# Patient Record
Sex: FEMALE | Race: WHITE | HISPANIC OR LATINO | Employment: FULL TIME | URBAN - METROPOLITAN AREA
[De-identification: names, ages, dates, MRNs, and addresses within clinical notes are randomized per-mention and may not be internally consistent; named-entity substitution may affect disease eponyms.]

---

## 2017-02-03 ENCOUNTER — HISTORICAL (OUTPATIENT)
Dept: ADMINISTRATIVE | Facility: HOSPITAL | Age: 47
End: 2017-02-03

## 2017-06-13 DIAGNOSIS — M25.552 LEFT HIP PAIN: Primary | ICD-10-CM

## 2017-06-14 ENCOUNTER — OFFICE VISIT (OUTPATIENT)
Dept: ORTHOPEDICS | Facility: CLINIC | Age: 47
End: 2017-06-14
Payer: COMMERCIAL

## 2017-06-14 ENCOUNTER — HOSPITAL ENCOUNTER (OUTPATIENT)
Dept: RADIOLOGY | Facility: HOSPITAL | Age: 47
Discharge: HOME | End: 2017-06-14
Attending: ORTHOPAEDIC SURGERY
Payer: COMMERCIAL

## 2017-06-14 VITALS
BODY MASS INDEX: 25.76 KG/M2 | DIASTOLIC BLOOD PRESSURE: 73 MMHG | WEIGHT: 140 LBS | HEIGHT: 62 IN | HEART RATE: 81 BPM | SYSTOLIC BLOOD PRESSURE: 108 MMHG

## 2017-06-14 DIAGNOSIS — M25.552 LEFT HIP PAIN: ICD-10-CM

## 2017-06-14 DIAGNOSIS — M25.552 LEFT HIP PAIN: Primary | ICD-10-CM

## 2017-06-14 DIAGNOSIS — M70.62 TROCHANTERIC BURSITIS, LEFT HIP: Primary | ICD-10-CM

## 2017-06-14 PROCEDURE — 99203 OFFICE O/P NEW LOW 30 MIN: CPT | Mod: S$GLB,,, | Performed by: ORTHOPAEDIC SURGERY

## 2017-06-14 PROCEDURE — 99999 PR PBB SHADOW E&M-EST. PATIENT-LVL III: CPT | Mod: PBBFAC,,, | Performed by: ORTHOPAEDIC SURGERY

## 2017-06-14 PROCEDURE — 73502 X-RAY EXAM HIP UNI 2-3 VIEWS: CPT | Mod: TC,PO,LT

## 2017-06-14 PROCEDURE — 73502 X-RAY EXAM HIP UNI 2-3 VIEWS: CPT | Mod: 26,LT,, | Performed by: RADIOLOGY

## 2017-06-14 NOTE — PROGRESS NOTES
History reviewed. No pertinent past medical history.    History reviewed. No pertinent surgical history.    No current outpatient prescriptions on file.     No current facility-administered medications for this visit.        Review of patient's allergies indicates:   Allergen Reactions    Benzodiazepines        History reviewed. No pertinent family history.    Social History     Social History    Marital status: Single     Spouse name: N/A    Number of children: N/A    Years of education: N/A     Occupational History    Not on file.     Social History Main Topics    Smoking status: Never Smoker    Smokeless tobacco: Never Used    Alcohol use No    Drug use: No    Sexual activity: Not on file     Other Topics Concern    Not on file     Social History Narrative    No narrative on file       Chief Complaint:   Chief Complaint   Patient presents with    Left Hip - Pain       History of present illness: Is a 46-year-old active female seen for about 1 year of left hip pain.  Patient denies an injury or trauma.  Patient has been seeing Dr. Ventura.  Patient has pain when laying down, sitting, standing or prolonged walking.  Pain is located laterally and in the groin at times.  She had a cortisone injection back in May didn't help.  She rates her pain as a 6 out of 10.  She had an MRI done which shows partial-thickness left gluteus minimus tendon tear.  She has never had physical therapy.  The next treatment option per Dr. Ventura was PRP.      Review of Systems:    Constitution: Negative for chills, fever, and sweats.  Negative for unexplained weight loss.    HENT:  Negative for headaches and blurry vision.    Cardiovascular:Negative for chest pain or irregular heart beat. Negative for hypertension.    Respiratory:  Negative for cough and shortness of breath.    Gastrointestinal: Negative for abdominal pain, heartburn, melena, nausea, and vomitting.    Genitourinary:  Negative bladder incontinence and  dysuria.    Musculoskeletal:  See HPI    Neurological: Negative for numbness.    Psychiatric/Behavioral: Negative for depression.  The patient is not nervous/anxious.      Endocrine: Negative for polyuria    Hematologic/Lymphatic: Negative for bleeding problem.  Does not bruise/bleed easily.    Skin: Negative for poor would healing and rash      Physical Examination:    Vital Signs:    Vitals:    06/14/17 0848   BP: 108/73   Pulse: 81       Body mass index is 25.61 kg/m².    This a well-developed, well nourished patient in no acute distress.  They are alert and oriented and cooperative to examination.  Pt. walks without an antalgic gait.      Examination of the patient's left hip shows full range of motion with flexion to 160°, extension to 0, external rotation to 50°, internal rotation of 15°, abduction of 50°, adduction of 15°. Skin has no rashes or bruising. Patient has negative Stinchfield exam. Patient has negative straight leg raise.Negative internal impingement test. Negative DAVID test. Negative Louie's test. Patient has no pain with hip range of motion.  Minimally tender to palpation over the greater trochanteric bursa. Patient is 5 out of 5 motor strength, palpable distal pulses, and intact light touch sensation.     Examination of the patient's right hip shows full range of motion with flexion to 160°, extension to 0, external rotation to 50°, internal rotation of 15°, abduction of 50°, adduction of 15°. Skin has no rashes or bruising. Patient has negative Stinchfield exam. Patient has negative straight leg raise.Negative internal impingement test. Negative DAVID test. Negative Louie's test. Patient has no pain with hip range of motion. Nontender to palpation over the greater trochanteric bursa. Patient is 5 out of 5 motor strength, palpable distal pulses, and intact light touch sensation.       X-rays: X-rays left hip are ordered and reviewed which show well-maintained joint space in both hips.  Does have  some arthritic changes of the lumbar spine    MRI of the left hip from [facility] dated February 3, 2017: Mild edema around the insertions of the left gluteus minimus and gluteus medius tendons.  Low-grade partial-thickness tear of the left gluteus minimus.  Mild left hamstring origin tendinosis.  L4-5 and L5-S1 degenerative disc disease.     Assessment:: Left gluteus minimus partial-thickness tearing    Plan:  I reviewed the x-rays and the MRI findings with her today.  I recommended formal physical therapy with focus treatment on the abductor tendons and trochanteric bursa.  Hopefully being get some healing of the tendons at that point.  We talked about activity.  I would let her exercises tolerated.  Follow-up in 6 weeks to see a physical therapy is helping.    This note was created using Dragon voice recognition software that occasionally misinterpreted phrases or words.    Consult note is delivered via Epic messaging service.

## 2017-06-15 ENCOUNTER — TELEPHONE (OUTPATIENT)
Dept: ORTHOPEDICS | Facility: CLINIC | Age: 47
End: 2017-06-15

## 2017-06-28 ENCOUNTER — CLINICAL SUPPORT (OUTPATIENT)
Dept: REHABILITATION | Facility: HOSPITAL | Age: 47
End: 2017-06-28
Attending: ORTHOPAEDIC SURGERY
Payer: COMMERCIAL

## 2017-06-28 DIAGNOSIS — M25.552 LEFT HIP PAIN: ICD-10-CM

## 2017-06-28 PROCEDURE — 97161 PT EVAL LOW COMPLEX 20 MIN: CPT | Mod: PN

## 2017-06-28 PROCEDURE — 97110 THERAPEUTIC EXERCISES: CPT | Mod: PN

## 2017-06-28 NOTE — PROGRESS NOTES
Physical Therapy Evaluation    Name: Fouzia Ovallerego  Clinic Number: [id number]      Medical Diagnosis:   Encounter Diagnosis   Name Primary?    Left hip pain      PT Diagnosis: L hip pain, LE weakness  Physician: Layton Sprague,*  Treatment Orders: PT Eval and Treat    No past medical history on file.  No current outpatient prescriptions on file.     No current facility-administered medications for this visit.      Review of patient's allergies indicates:   Allergen Reactions    Benzodiazepines        Precautions: standard    Evaluation Date: 6/28/17      Subjective     Onset Date: pain to L hip for at least a year.  She ran in marathons and would get pain at mile 6, when she started training for another marathon she started having pain at 1.5 miles.  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 female that presents to [facility] secondary to lateral L hip.     Imaging: X-ray or MRI taken and revealed 1. Mild edema about insertions of left gluteus minimus and gluteus medius  tendons, with moderately attenuated insertion of left gluteus minimus tendon  suggesting low-grade partial-thickness tear of the left gluteus minimus tendon.  Edema suggesting peritendinitis inflammation/enthesopathy which could be  related to low-grade strain if history of recent trauma or chronic repetitive  stress. Additional associated mild tendinosis of left gluteus minimus tendon is  evident    2. Mild left hamstrings origin tendinosis.    3. L4-L5 and L5-S1 degenerative disc disease, partially  visualized..    Pain: current 9/10, worst 0/10, best 0/10  Aggravating factors: sit to stand, lying in bed at night, ice  Easing factors: pain meds, ibuprofen    Pts goals: decreased L hip pain, return to running      No cultural,  or spiritual barriers identified to treatment or learning.  Environmental barriers: 3 flights of stairs at apt (pt able to perform, but pain after 1st flight)  Objective     Observation: pt is pleasant and cooperative    Posture: R shoulder elevated         Hip Range of Motion:   Left active Right active    Flexion       Abduction       Extension       Ext. Rotation 33 35   Int. Rotation 35 pain 40             Lower Extremity Strength  Right LE  Left LE    Knee extension: 5/5 Knee extension: 5/5   Knee flexion: 4+/5 Knee flexion: 5/5   Hip flexion: 4+/5 Hip flexion: 4/5 pain   Hip Internal Rotation:  4+/5    Hip Internal Rotation: 4+/5      Hip External Rotation: 5/5    Hip External Rotation: 4+/5      Hip extension:  4/5 Hip extension: 4/5   Hip abduction: 4/5 Hip abduction: 4+/5   Hip adduction: 4/5 Hip adduction 4+/5   Ankle dorsiflexion: 5/5 Ankle dorsiflexion: 5/5   Ankle plantarflexion: 5/5 Ankle plantarflexion: 5/5       Flexibility:    Ely's test: R = - degrees ; L = - degrees   Popliteal Angle: R = -0 degrees ; L = -0 degrees   Louie's test: R = + ; L = +     Palpation: TTP to L greater trochanter    Sensation: grossly intact to light touch    Edema: none    Functional Limitations Reports - G Codes  Category: mobility  CMS Impairment/Limitation/Restriction for FOTO Hip Survey  Status Limitation G-Code CMS Severity Modifier  Intake 52% 48% Current Status CK - At least 40 percent but less than 60 percent  Predicted 68% 32% Goal Status+ CJ - At least 20 percent but less than 40 percent    PT Evaluation Completed? Yes  Discussed Plan of Care with patient: Yes    TREATMENT:  Fouzia received therapeutic exercises to develop strength and endurance, flexibility for 25 minutes  including:  L piriformis release in supine over racket ball x2 min  L piriformis stretch (fig 4) supine 3x20 sec  L TFL stretch in supine with strap 3x20 sec  SLR x10  SL hip add x10  SL hip abd/ext x10  SL clams with TA set 10x3 sec  Bridges with hip abd iso RTB 10x5 sec    Pt educated on importance of performing ex and daily activities in pain free range to avoid exacerbating inflammation and pain.  Pt gave verbal understanding.    Fouzia  received the following manual therapy techniques x 5 min. To include pin and stretch to L piriformis in prone (hip ER)     Pt. Received cold pack x 10 min. To L hip/buttock. Following treatment.    HEP Provided: pt given verbal and written instruction for all ex listed above  Instructed pt. Regarding: Proper technique with all exercises. Pt demo good understanding of the education provided. Fouzia demonstrated good return demonstration of activities.  Pt/family was provided educational information, including: role of PT, goals for PT, scheduling - pt verbalized understanding. Discussed insurance limitations with pt.     Pt has no cultural, educational or language barriers to learning provided.    Assessment     This is a 46 y.o. female referred to outpatient physical therapy and presents with a medical diagnosis of L hip pain and demonstrates limitations as described in the problem list. Pt will benefit from physcial therapy services in order to maximize pain free and/or functional use of left LE. The following goals were discussed with the patient and patient is in agreement with them as to be addressed in the treatment plan.     History  Co-morbidities and personal factors that may impact the plan of care Examination  Body Structures and Functions, activity limitations and participation restrictions that may impact the plan of care Clinical Presentation   Decision Making/ Complexity Score   Co-morbidities:         none        Personal Factors:    Body Regions: L LE    Body  Systems:     Decreased L hip ROM  Decreased LE strength  Decreased IT band and piriformis flexibilty      Activity limitations:   Stairs, prolonged walking, running    Participation Restrictions:     Marathons, stairs at apt, work duties     stable low         Medical necessity is demonstrated by the following IMPAIRMENTS/PROBLEM LIST:   1)Increase in L hip pain level limiting function   2)decreased LE strength   3)decreased IT band flexibilty   4)decreased L hip ROM   5)Lack of HEP      Pt's spiritual, cultural and educational needs considered and pt agreeable to plan of care and goals as stated below:     GOALS: Short Term Goals:  4 weeks  1.Report decreased    L hip    pain  <   / =  3  /10  to increase tolerance for stairs  2. Increased L hip IR by 3 deg for increased functional mobility  3. Increased L LE strength by 1/3 muscle grade in all deficient planes to increase tolerance for ADL and work activities.  4. Pt will present without L piriformis tightness for improved posture and tolerance for stairs  5. Pt to tolerate HEP to improve ROM and independence with ADL's    Long Term Goals: 8-12 weeks  1.Report decreased    L hip    pain  <   / =  1  /10  to increase tolerance for stairs  2. Increased L hip IR by 5 deg for increased functional mobility  3. Increased L LE strength by 1 muscle grade in all deficient planes to increase tolerance for ADL and work activities.  4. Pt to tolerate HEP to improve ROM and independence with ADL's  5. Negative Louie's test for improved TFL and IT band flexibility for improved form walking  6. Pt will report running 3-5 miles without pain.    Plan     Pt will be treated by physical therapy 1-3 times a week for 8-12 weeks for Pt Education, HEP, therapeutic exercises, neuromuscular re-education, joint mobilizations, modalities prn to achieve established goals. Fouzia may at times be seen by a PTA as part of the Rehab Team.     Cont PT for  8-12 weeks.     I certify the need for  these services furnished under this plan of treatment and while under my care.______________________________ Physician/Referring Practitioner  Date of Signature

## 2017-07-05 ENCOUNTER — CLINICAL SUPPORT (OUTPATIENT)
Dept: REHABILITATION | Facility: HOSPITAL | Age: 47
End: 2017-07-05
Attending: ORTHOPAEDIC SURGERY
Payer: COMMERCIAL

## 2017-07-05 DIAGNOSIS — M25.552 LEFT HIP PAIN: ICD-10-CM

## 2017-07-05 PROCEDURE — 97140 MANUAL THERAPY 1/> REGIONS: CPT | Mod: PN

## 2017-07-05 PROCEDURE — 97110 THERAPEUTIC EXERCISES: CPT | Mod: PN

## 2017-07-05 NOTE — PROGRESS NOTES
[id number]  Date of Treatment: 07/05/2017   Diagnosis:   Encounter Diagnosis   Name Primary?    Left hip pain        Visit number: 2   Start date: 06/28/17  POC End date: 09/20/17 (12 weeks)    Time in: 5:02  Time Out: 5:53  Total Treatment Time: 50  1:1 Time: 30 min    Precautions: standard    Subjective:    Fouzia reports improvement of symptoms to the L hip. States most of her pain is when transferring from sitting to standing, which causes pain to be 7/10. Patient reports their current pain to be 0/10 on a 0-10 scale with 0 being no pain and 10 being the worst pain imaginable. 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    Objective  L hip anteriorly rotated upon arrival. Pt received MET to correct rotation prior to tx.    Fouzia received therapeutic exercises to develop strength and endurance, flexibility for 40 minutes (20 min 1:1) including:  L piriformis release in supine over racket ball x2 min  L piriformis stretch (fig 4) supine 3x20 sec  L TFL stretch in supine with strap 3x20 sec  SLR 2x10  SL hip add 2x10  SL hip abd/ext 2x10  SL clams with TA set 20x3 sec  Bridges with hip abd iso RTB 2x10x5 sec     Pt educated on importance of performing ex and daily activities in pain free range to avoid exacerbating inflammation and pain.  Pt gave verbal understanding.     Fouzia  received the following manual therapy techniques x 10 min to include STM and myofascial release of anterior aspect of ITB on L thigh     Pt. Received cold pack x 10 min. To L hip/buttock. Following treatment. (pt deferred)     HEP Provided: pt given verbal and written instruction for all ex listed above.Educated to attempt at least 1 flight of stairs to apartment, either first or last, to assess and slowly build tolerance to activity.  Instructed pt. Regarding: Proper technique with all exercises.  Pt demo good understanding of the education provided. Fouzia demonstrated good return demonstration of activities.    Assessment:     Pt tolerated treatment well this date without increased soreness or pain. L hip pain remains 0/10 post tx. TTP around lateral aspect of L hip joint as well as anterior border of L ITB, (distal > proximal).  Pt will continue to benefit from skilled PT intervention. Medical Necessity is demonstrated by:  Pain limits function of effected part for some activities, Unable to participate fully in daily activities and Weakness.    Patient is making good progress towards established goals.        Plan:  Continue with established Plan of Care towards PT goals.

## 2017-07-06 NOTE — PROGRESS NOTES
Name: Fouzia Gaviria  [facility] Number: [id number]  Date of Treatment: 07/06/2017   Diagnosis:   Encounter Diagnosis   Name Primary?    Left hip pain        Visit number: 3  Start date: 06/28/17  POC End date: 09/20/17 (12 weeks)    Time in: 5:05  Time Out: 6:00  Total Treatment Time: 50  1:1 time: 40    Precautions: standard    Subjective:    Fouzia reports improvement of symptoms to the L hip. States most of her pain is when transferring from sitting to standing, which causes pain to be 7/10. Patient reports their current pain to be 0/10 on a 0-10 scale with 0 being no pain and 10 being the worst pain imaginable. 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    Objective  L hip anteriorly rotated upon arrival. Pt received MET to correct rotation prior to tx.  Fouzia  received the following manual therapy techniques x 10 min to include: MET to correct L anterior innominate, jt mob to correct L anterior innominate in R SL, STM and myofascial release of anterior aspect of ITB on L thigh    Fouzia received therapeutic exercises to develop strength and endurance, flexibility for 40 minutes (20 min 1:1) including:  L piriformis release in supine over racket ball x2 min  L piriformis stretch (fig 4) supine 3x20 sec  L TFL stretch in supine with strap 3x20 sec  SLR 2x10  SL hip add 2x10  SL hip abd/ext 2x10  SL clams with TA set RTB 20x3 sec  Bridges with hip abd iso RTB 2x10x5 sec  SL sidelying shuttle 0.5# 2x10 ea    Add hip abd walk and mini squats next visit     Pt instructed in and perform stick massage to L IT band x2 min    Pt educated on importance of performing ex and daily activities in pain free range to avoid exacerbating inflammation and pain.  Pt gave verbal understanding.     Pt. Received cold pack x 10 min. To L hip/buttock.      HEP Provided: pt given RTB for clams.    Instructed pt. Regarding: Proper  technique with all exercises. Pt demo good understanding of the education provided. Fouzia demonstrated good return demonstration of activities.    Assessment:     Pt tolerated treatment well reporting 0/10 pain post tx.  She was able to tolerate addition of SL shuttle and increased resistance for clams without exacerbation of symptoms.  Continues to present with L anterior innominate and tight L IT band which were both addressed with manual therapy.  Pt will continue to benefit from skilled PT intervention. Medical Necessity is demonstrated by:  Pain limits function of effected part for some activities, Unable to participate fully in daily activities and Weakness.    Patient is making good progress towards established goals.        Plan:  Continue with established Plan of Care towards PT goals. Continue to progress glute strengthening and LE flexibility as tolerated.

## 2017-07-10 ENCOUNTER — CLINICAL SUPPORT (OUTPATIENT)
Dept: REHABILITATION | Facility: HOSPITAL | Age: 47
End: 2017-07-10
Attending: ORTHOPAEDIC SURGERY
Payer: COMMERCIAL

## 2017-07-10 DIAGNOSIS — M25.552 LEFT HIP PAIN: ICD-10-CM

## 2017-07-10 PROCEDURE — 97110 THERAPEUTIC EXERCISES: CPT | Mod: PN

## 2017-07-10 PROCEDURE — 97140 MANUAL THERAPY 1/> REGIONS: CPT | Mod: PN

## 2017-07-13 ENCOUNTER — CLINICAL SUPPORT (OUTPATIENT)
Dept: REHABILITATION | Facility: HOSPITAL | Age: 47
End: 2017-07-13
Attending: ORTHOPAEDIC SURGERY
Payer: COMMERCIAL

## 2017-07-13 DIAGNOSIS — M25.552 LEFT HIP PAIN: ICD-10-CM

## 2017-07-13 PROCEDURE — 97110 THERAPEUTIC EXERCISES: CPT | Mod: PN

## 2017-07-13 PROCEDURE — 97140 MANUAL THERAPY 1/> REGIONS: CPT | Mod: PN

## 2017-07-13 NOTE — PROGRESS NOTES
Name: Fouzia Gaviria  [facility] Number: [id number]  Date of Treatment: 07/13/2017   Diagnosis:   Encounter Diagnosis   Name Primary?    Left hip pain        Visit number: 4  Start date: 06/28/17  POC End date: 09/20/17 (12 weeks)    Time in: 2:03  Time Out: 3:10  Total Treatment Time: 55  1:1 time: 30    Precautions: standard    Subjective:    Fouzia reports improvement of symptoms to the L hip. Denies any pain in the L hip currently. Patient reports their current pain to be 0/10 on a 0-10 scale with 0 being no pain and 10 being the worst pain imaginable. 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    Objective  L hip anteriorly rotated upon arrival. Pt received MET to correct rotation prior to tx.  Fouzia  received the following manual therapy techniques x 10 min to include: MET to correct L anterior innominate, jt mob to correct L anterior innominate in R SL, STM and myofascial release of anterior aspect of ITB on L thigh    Fouzia received therapeutic exercises to develop strength and endurance, flexibility for 45 minutes (20 min 1:1) including:  L piriformis release in supine over racket ball x2 min  L piriformis stretch (fig 4) supine 3x20 sec  L TFL stretch in supine with strap 3x20 sec  SLR 2x10  SL hip add 2x10  SL hip abd/ext 2x10  SL clams with TA set RTB 20x3 sec  Bridges with hip abd iso RTB 2x10x5 sec  SL sidelying shuttle 0.5# 2x10 ea  ADDED:  hip abd walk red sport loop x 3 laps in // bars  mini squats 2x10     Pt instructed in and perform stick massage to L IT band x2 min    Pt educated on importance of performing ex and daily activities in pain free range to avoid exacerbating inflammation and pain.  Pt gave verbal understanding.     Pt. Received cold pack x 10 min. To L hip/buttock.      HEP Provided: pt educated to continue with current written HEP.     Instructed pt. Regarding: Proper technique  with all exercises. Pt demo good understanding of the education provided. Fouzia demonstrated good return demonstration of activities.    Assessment:     Pt demonstrated overall good tolerance to treatment session without increased soreness or pain. Post tx she rates her L lateral hip pain to be 0/10. Able to tolerate sidesteps with RTB and mini squats without exacerbation of symptoms. Continues to present with mild L anterior innominate and tight L IT band which were both addressed with manual therapy and relief reported post tx. Mild tenderness at mid ITB and superior to greater trochanter with STM.   Pt will continue to benefit from skilled PT intervention. Medical Necessity is demonstrated by:  Pain limits function of effected part for some activities, Unable to participate fully in daily activities and Weakness.    Patient is making good progress towards established goals.        Plan:  Continue with established Plan of Care towards PT goals. Continue to progress glute strengthening and LE flexibility as tolerated.

## 2017-07-17 ENCOUNTER — CLINICAL SUPPORT (OUTPATIENT)
Dept: REHABILITATION | Facility: HOSPITAL | Age: 47
End: 2017-07-17
Attending: ORTHOPAEDIC SURGERY
Payer: COMMERCIAL

## 2017-07-17 DIAGNOSIS — M25.552 LEFT HIP PAIN: ICD-10-CM

## 2017-07-17 PROCEDURE — 97110 THERAPEUTIC EXERCISES: CPT | Mod: PN

## 2017-07-17 PROCEDURE — 97140 MANUAL THERAPY 1/> REGIONS: CPT | Mod: PN

## 2017-07-17 NOTE — PROGRESS NOTES
"Name: Fouzia Gaviria  [facility] Number: [id number]  Date of Treatment: 07/17/2017   Diagnosis:   Encounter Diagnosis   Name Primary?    Left hip pain        Visit number: 5  Start date: 06/28/17  POC End date: 09/20/17 (12 weeks)    Time in: 5:15  Time Out: 6:00  Total Treatment Time: 40    Precautions: standard    Subjective:    Patient reports their current pain to be 0/10 at rest and 5/10 with walking to anterior L hip on a 0-10 scale with 0 being no pain and 10 being the worst pain imaginable. She states her L hip pain increased after prolonged standing/walking and "overdoing it" at a birthday party on Saturday.    Objective  L anterior innominate.   TTP to L grt trochanter    Fouzia  received the following manual therapy techniques x 10 min to include: MET to correct L anterior innominate, L psoas release  Fouzia received therapeutic exercises to develop strength and endurance, flexibility for 30 minutes including:  L piriformis release in supine over racket ball x2 min  L piriformis stretch (fig 4) supine 3x20 sec  L TFL stretch in supine with strap 3x20 sec  SLR 2x10 NP  SL hip add 2x10 NP  SL hip abd/ext 2x10  SL clams with TA set blue sport loop 20x3 sec  Bridges with hip abd iso blue sport loop 2x10x5 sec  SL sidelying shuttle 0.5# 2x10 ea  hip abd walk red sport loop x 3 laps in // bars NP  mini squats x10  FMS squats     Pt instructed in and perform stick massage to L IT band x2 min NP    Pt educated on importance of performing ex and daily activities in pain free range to avoid exacerbating inflammation and pain.  Pt gave verbal understanding.     Pt. Received cold pack x 10 min. To L hip/buttock.      HEP Provided: pt educated to continue with current written HEP.   Pt instructed to avoid activities which increase pain and apply ice to L hip daily for decreased pain and inflammation.  Pt gave verbal understanding.  Instructed pt. Regarding: Proper technique with all exercises. Pt demo good " understanding of the education provided. Fouzia demonstrated good return demonstration of activities.    Assessment:     Pt tolerated tx session fairly well reporting 0/10 pain post tx.  She presented with L anterior innominate and tight L hip flexor which corrected with manual therapy.  Pt with increased pain/inflammation as noted by pain to L hip with L sidelying today.  Pt will continue to benefit from skilled PT intervention. Medical Necessity is demonstrated by:  Pain limits function of effected part for some activities, Unable to participate fully in daily activities and Weakness.    Patient is making good progress towards established goals.        Plan:  Continue with established Plan of Care towards PT goals. Continue to progress glute strengthening and LE flexibility as tolerated.

## 2017-07-19 ENCOUNTER — CLINICAL SUPPORT (OUTPATIENT)
Dept: REHABILITATION | Facility: HOSPITAL | Age: 47
End: 2017-07-19
Attending: ORTHOPAEDIC SURGERY
Payer: COMMERCIAL

## 2017-07-19 DIAGNOSIS — M25.552 LEFT HIP PAIN: ICD-10-CM

## 2017-07-19 PROCEDURE — 97110 THERAPEUTIC EXERCISES: CPT | Mod: PN

## 2017-07-19 NOTE — PROGRESS NOTES
Name: Fouzia Gaviria  Clinic Number: [id number]  Date of Treatment: 07/19/2017   Diagnosis:   Encounter Diagnosis   Name Primary?    Left hip pain        Visit number: 6  Start date: 06/28/17  POC End date: 09/20/17 (12 weeks)    Time in: 5:03  Time Out: 6:07  Total Treatment Time: 50    Precautions: standard    Subjective:    Patient reports their current pain to be 0/10 at rest to anterior L hip on a 0-10 scale with 0 being no pain and 10 being the worst pain imaginable. She states her L quad feels tight/sore.     Objective  Slight L anterior innominate pre-treatment  TTP to L grt trochanter    Fouzia  received the following manual therapy techniques x 2 min to include: MET to correct L anterior innominate    Fouzia received therapeutic exercises to develop strength and endurance, flexibility for 50 minutes including:  L piriformis release in supine over racket ball x2 min  L piriformis stretch (fig 4) supine 3x20 sec  L TFL stretch in supine with strap 3x20 sec  Prone quad stretch with strap and 1/2 roll under distal thigh 3x20 sec - relief noted  SLR 2x10  SL hip add 2x10  SL hip abd/ext 2x10  SL clams with TA set blue sport loop 20x3 sec  Bridges with hip abd iso blue sport loop 2x10x5 sec  SL (bilateral) sidelying shuttle 0.5# 2x10 ea  hip abd walk red sport loop x 3 laps in // bars   mini squats x10  FMS squats with 1# dowel x 10     Pt instructed in and performed stick massage to L IT band x2 min    Pt educated on importance of performing ex and daily activities in pain free range to avoid exacerbating inflammation and pain.  Pt gave verbal understanding.     Pt. Received cold pack x 10 min. To L hip/buttock.      HEP Provided: pt educated to continue with current written HEP.   Pt instructed to avoid activities which increase pain and apply ice to L hip daily for decreased pain and inflammation.  Pt gave verbal understanding.  Instructed pt. Regarding: Proper technique with all exercises. Pt theron  good understanding of the education provided. Fouzia demonstrated good return demonstration of activities.    Assessment:     Fouzia tolerated treatment well this date without onset of soreness or pain in the L hip. She continues with mild tenderness and tightness in the mid L ITB. Minimal L anterior innominate upon arrival which improved with MET. Pt able to tolerate all exercises this date, mild verbal and tactile cues for proper positioning for SL CLAMS on L hip. Strength slowly improving in core and L hip. She will benefit from continued treatment.   Pt will continue to benefit from skilled PT intervention. Medical Necessity is demonstrated by:  Pain limits function of effected part for some activities, Unable to participate fully in daily activities and Weakness.    Patient is making good progress towards established goals.      Plan:  Continue with established Plan of Care towards PT goals. Continue to progress glute strengthening and LE flexibility as tolerated.

## 2017-07-24 ENCOUNTER — CLINICAL SUPPORT (OUTPATIENT)
Dept: REHABILITATION | Facility: HOSPITAL | Age: 47
End: 2017-07-24
Attending: ORTHOPAEDIC SURGERY
Payer: COMMERCIAL

## 2017-07-24 DIAGNOSIS — M25.552 LEFT HIP PAIN: ICD-10-CM

## 2017-07-24 PROCEDURE — 97110 THERAPEUTIC EXERCISES: CPT | Mod: PN

## 2017-07-24 NOTE — PROGRESS NOTES
Name: Fouzia Gaviria  Clinic Number: [id number]  Date of Treatment: 07/24/2017   Diagnosis:   Encounter Diagnosis   Name Primary?    Left hip pain        Visit number: 7  Start date: 06/28/17  POC End date: 09/20/17 (12 weeks)    Time in: 3:04  Time Out: 3:59  Total Treatment Time: 55    Precautions: standard    Subjective:    Patient reports their current pain to be 0/10 at rest to anterior and posterior L hip on a 0-10 scale with 0 being no pain and 10 being the worst pain imaginable. States she had mild tightness     Objective  B pelvis aligned pre-treatment    Fouzia  received the following manual therapy techniques x 2 min to include: MET to correct L anterior innominate (not performed)    Fouzia received therapeutic exercises to develop strength and endurance, flexibility for 55 minutes including:  L piriformis release in supine over racket ball x2 min (NP)  L piriformis stretch (fig 4) supine 3x20 sec  L TFL stretch in supine with strap 3x20 sec  Prone quad stretch with strap and 1/2 roll under distal thigh 2 x 60 sec - relief noted  SLR 3x10  SL hip add 2x10  SL hip abd/ext 2x10  SL clams with TA set blue sport loop 20x3 sec  Bridges with hip abd iso blue sport loop 2x10x5 sec  SL (bilateral) sidelying shuttle 1# 2x10 ea  hip abd walk red sport loop x 4 laps in // bars   mini squats x10 (NP)  FMS squats with 1# dowel 2 x 10     Pt instructed in and performed stick massage to L IT band x2 min    Pt educated on importance of performing ex and daily activities in pain free range to avoid exacerbating inflammation and pain.  Pt gave verbal understanding.     Pt. Received cold pack x 10 min. To L hip/buttock. (pt deferred)     HEP Provided: pt educated to continue with current written HEP.   Pt instructed to avoid activities which increase pain and apply ice to L hip daily for decreased pain and inflammation.  Pt gave verbal understanding.  Instructed pt. Regarding: Proper technique with all exercises. Pt  theron good understanding of the education provided. Fouzia demonstrated good return demonstration of activities.    Assessment:     Pt demonstrated good tolerance to treatment this date without reported soreness or pain in the L hip. Rates 0/10 pain post treatment. Decreased tightness of L ITB and is compliant with using roller at home. Pelvis aligned post treatment. Core and B hip strength continues to improve.    Pt will continue to benefit from skilled PT intervention. Medical Necessity is demonstrated by:  Pain limits function of effected part for some activities, Unable to participate fully in daily activities and Weakness.    Patient is making good progress towards established goals.      Plan:  Continue with established Plan of Care towards PT goals. Continue to progress glute strengthening and LE flexibility as tolerated.

## 2017-07-26 ENCOUNTER — CLINICAL SUPPORT (OUTPATIENT)
Dept: REHABILITATION | Facility: HOSPITAL | Age: 47
End: 2017-07-26
Attending: ORTHOPAEDIC SURGERY
Payer: COMMERCIAL

## 2017-07-26 DIAGNOSIS — M25.552 LEFT HIP PAIN: ICD-10-CM

## 2017-07-26 PROCEDURE — 97110 THERAPEUTIC EXERCISES: CPT | Mod: PN

## 2017-07-26 NOTE — PROGRESS NOTES
Name: Fouzia Gaviria  Clinic Number: [id number]  Date of Treatment: 07/26/2017   Diagnosis:   Encounter Diagnosis   Name Primary?    Left hip pain        Visit number: 8  Start date: 06/28/17  POC End date: 09/20/17 (12 weeks)    Time in: 4:08  Time Out: 5:00  Total Treatment Time: 50    Precautions: standard    Subjective:    Patient reports their current pain in the anterior and posterior L hip to be 0/10 on a 0-10 scale with 0 being no pain and 10 being the worst pain imaginable. States she had mild tightness and discomfort around L greater trochanter.    Objective  B pelvis aligned pre-treatment    Fouzia  received the following manual therapy techniques x 2 min to include: inferior femoral glide with passive hip abduction.     Fouzia received therapeutic exercises to develop strength and endurance, flexibility for 50 minutes including:  L piriformis release in supine over racket ball x2 min (NP)  L piriformis stretch (fig 4) supine 3x20 sec  L TFL stretch in supine with strap 3x20 sec  Prone quad stretch with strap and 1/2 roll under distal thigh 2 x 60 sec - relief noted  SLR 3x10  SL hip add 2x10  SL hip abd/ext with blue sport loop 2x10  SL clams with TA set blue sport loop 20x3 sec  Bridges with hip abd iso blue sport loop 2x10x5 sec  Single leg bridge with TA set x10 each  SL (bilateral) sidelying shuttle 1 band 2x10 ea  hip abd walk red sport loop x 1 lap on orange/gray walkway   mini squats x10 (NP)  FMS squats with 1# dowel 2 x 10     Pt instructed in and performed stick massage to L IT band x2 min (NP)    Pt. Received cold pack x 10 min. To L hip/buttock. (pt deferred)     HEP Provided: pt educated to continue with current written HEP.   Pt instructed to avoid activities which increase pain and apply ice to L hip daily for decreased pain and inflammation.  Pt gave verbal understanding.  Instructed pt. Regarding: Proper technique with all exercises. Pt demo good understanding of the education  guicho Fragoso demonstrated good return demonstration of activities.    Assessment:     Pt tolerated treatment well this date overall without increased pain or soreness. She presents with mild tenderness superior to greater trochanter near throchanteric bursa, slight relief following inferior glide with passive hip abduction. Tenderness also near  She rates pain at 0/10 post treatment. Decreased tightness of L ITB with no tenderness reported. Pelvis aligned pre and post treatment. Core and B hip strength continues to improve, fatigue with resisted activity.    Pt will continue to benefit from skilled PT intervention. Medical Necessity is demonstrated by:  Pain limits function of effected part for some activities, Unable to participate fully in daily activities and Weakness.    Patient is making good progress towards established goals.      Plan:  Continue with established Plan of Care towards PT goals. Continue to progress glute strengthening and LE flexibility as tolerated.

## 2017-07-31 ENCOUNTER — CLINICAL SUPPORT (OUTPATIENT)
Dept: REHABILITATION | Facility: HOSPITAL | Age: 47
End: 2017-07-31
Attending: ORTHOPAEDIC SURGERY
Payer: COMMERCIAL

## 2017-07-31 DIAGNOSIS — M25.552 LEFT HIP PAIN: ICD-10-CM

## 2017-07-31 PROCEDURE — 97110 THERAPEUTIC EXERCISES: CPT | Mod: PN

## 2017-07-31 NOTE — PROGRESS NOTES
Name: Fouzia Gaviria  Clinic Number: [id number]  Date of Treatment: 07/31/2017   Diagnosis:   Encounter Diagnosis   Name Primary?    Left hip pain        Visit number: 8  Start date: 06/28/17  POC End date: 09/20/17 (12 weeks)    Time in: 3:50  Time Out: 5:00  Total Treatment Time: 55  1:1 time: 40    Precautions: standard    Subjective:    Patient reports their current pain in the anterior and posterior L hip to be 0/10 on a 0-10 scale with 0 being no pain and 10 being the worst pain imaginable. States she had mild tightness and discomfort around L greater trochanter.    Objective  Hip Range of Motion:   Left active Right active    Ext. Rotation 33 35   Int. Rotation 35  40       Lower Extremity Strength  Right LE  Left LE    Knee extension: 5/5 Knee extension: 5/5   Knee flexion: 5/5 Knee flexion: 5/5   Hip flexion: 4+/5 Hip flexion: 4+/5 pain   Hip Internal Rotation:  5/5    Hip Internal Rotation: 4+/5      Hip External Rotation: 5/5    Hip External Rotation: 5/5      Hip extension:  4/5 Hip extension: 4/5   Hip abduction: 4+/5 Hip abduction: 4+/5   Hip adduction: 4+/5 Hip adduction 4+/5   Ankle dorsiflexion: 5/5 Ankle dorsiflexion: 5/5   Ankle plantarflexion: 5/5 Ankle plantarflexion: 5/5       Flexibility:    Ely's test: R = - degrees ; L = - degrees   Popliteal Angle: R = -0 degrees ; L = -0 degrees   Louie's test: R = + ; L = -     B pelvis aligned pre-treatment    Fouzia  received the following manual therapy techniques x 2 min to include: long axis distraction of L hip     Fouzia  instructed in and performed therapeutic exercises to develop strength and endurance, flexibility for 50 minutes including:  L piriformis stretch (fig 4) supine 3x20 sec NP  L TFL stretch in supine with strap 3x20 sec  Prone quad stretch with strap and 1/2 roll under distal thigh 2 x 60 sec - relief noted  SLR 3x10 NP  SL hip add 2x10 NP  SL hip abd/ext with blue sport loop 2x10 on R and x15 on L 9  SL clams with TA set blue  sport loop 20x3 sec  Bridges with hip abd iso blue sport loop 2x10x5 sec  Single leg bridge with TA set x10 each  SL (bilateral) sidelying shuttle 1 band 2x10 ea  hip abd walk red sport loop x 1 lap on orange/gray walkway   mini squats x10 (NP)  FMS squats with 1# dowel 2 x 10 NP  Hip add in standing GTB 2x10 ea       Pt. Received cold pack x 10 min. To L hip/buttock     HEP Provided: pt educated to continue with current written HEP.   Pt instructed to avoid activities which increase pain and apply ice to L hip daily for decreased pain and inflammation.  Pt gave verbal understanding.  Instructed pt. Regarding: Proper technique with all exercises. Pt demo good understanding of the education provided. Fouzia demonstrated good return demonstration of activities.    Assessment:     Pt tolerated treatment well reporting 0/10 pain post tx.  She presented with slight L anterior innominate which corrected easily with MET.  Pt is improving with overall B LE strength and flexibility.  Pt will continue to benefit from skilled PT intervention. Medical Necessity is demonstrated by:  Pain limits function of effected part for some activities, Unable to participate fully in daily activities and Weakness.    Patient is making good progress towards established goals.  Short Term Goals:  4 weeks  1.Report decreased    L hip    pain  <   / =  3  /10  to increase tolerance for stairs (met)  2. Increased L hip IR by 3 deg for increased functional mobility (progressing)  3. Increased L LE strength by 1/3 muscle grade in all deficient planes to increase tolerance for ADL and work activities. (met)  4. Pt will present without L piriformis tightness for improved posture and tolerance for stairs (met)  5. Pt to tolerate HEP to improve ROM and independence with ADL's (met)    Long Term Goals: 8-12 weeks (progressing)  1.Report decreased    L hip    pain  <   / =  1  /10  to increase tolerance for stairs  2. Increased L hip IR by 5 deg for  increased functional mobility  3. Increased L LE strength by 1 muscle grade in all deficient planes to increase tolerance for ADL and work activities.  4. Pt to tolerate HEP to improve ROM and independence with ADL's  5. Negative Louie's test for improved TFL and IT band flexibility for improved form walking  6. Pt will report running 3-5 miles without pain.    Unmet goals continue to remain appropriate within 8 weeks.    Plan:  Continue with established Plan of Care towards PT goals. Continue to progress glute strengthening and LE flexibility as tolerated.    PT/PTA met face to face to discuss patient's treatment plan and progress towards established goals.  Treatment will be continued as described in initial report/eval and progress notes.  Patient will be seen by physical therapist every sixth visit and minimally once per month.

## 2017-08-02 ENCOUNTER — CLINICAL SUPPORT (OUTPATIENT)
Dept: REHABILITATION | Facility: HOSPITAL | Age: 47
End: 2017-08-02
Attending: ORTHOPAEDIC SURGERY
Payer: COMMERCIAL

## 2017-08-02 DIAGNOSIS — M25.552 LEFT HIP PAIN: ICD-10-CM

## 2017-08-02 PROCEDURE — 97110 THERAPEUTIC EXERCISES: CPT | Mod: PN

## 2017-08-02 NOTE — PROGRESS NOTES
Name: Fuozia Gaviria  Clinic Number: [id number]  Date of Treatment: 08/02/2017   Diagnosis:   Encounter Diagnosis   Name Primary?    Left hip pain        Visit number: 9  Start date: 06/28/17  POC End date: 09/20/17 (12 weeks)    Time in: 4:00 (patient arrived 1 hour early due to schedule mix-up)  Time Out: 4:52  Total Treatment Time: 50  1:1 time: 30    Precautions: standard    Subjective:    Patient reports their current pain in the anterior and posterior L hip to be 0/10 on a 0-10 scale with 0 being no pain and 10 being the worst pain imaginable. Has been icing more frequently which has helped reduce some pain and tightness. Mild pressure in the lateral L hip around L greater trochanter.    Objective     B pelvis aligned pre- and post treatment    Fouzia  received the following manual therapy techniques x 2 min to include: long axis distraction of L hip     Fouzia  instructed in and performed therapeutic exercises to develop strength and endurance, flexibility for 50 minutes including:  L piriformis stretch (fig 4) supine 3x20 sec NP  L TFL stretch in supine with strap 3x20 sec  Prone quad stretch with strap and 1/2 roll under distal thigh 2 x 60 sec - relief noted  SLR 3x10 NP  SL hip abd/ext with blue sport loop 2x10 each  SL clams with TA set blue sport loop 20x3 sec  Bridges with hip abd iso blue sport loop 2x10x5 sec  Single leg bridge with TA set x10 each   SL (bilateral) sidelying shuttle 1 band 2x15 ea  hip abd walk red sport loop x 1 lap on orange/gray walkway   FMS squats with 1# dowel 2 x 10 NP  Hip add in standing GTB 2x10 ea     Pt. Received cold pack x 10 min. To L hip/buttock     HEP Provided: pt educated to continue with current written HEP.   Pt instructed to avoid activities which increase pain and apply ice to L hip daily for decreased pain and inflammation.  Pt gave verbal understanding.  Instructed pt. Regarding: Proper technique with all exercises. Pt demo good understanding of the  education provided. Fouzia demonstrated good return demonstration of activities.    Assessment:     Pt demonstrated good tolerance to treatment this date without onset of soreness or pain. She continues with report of 0/10 pain post tx. Symmetric pelvic alignment this date pre and post treatment. Core and LE strength continues to improve. She remains compliant with HEP and motivated to improve.  Pt will continue to benefit from skilled PT intervention. Medical Necessity is demonstrated by:  Pain limits function of effected part for some activities, Unable to participate fully in daily activities and Weakness.    Patient is making good progress towards established goals.  Short Term Goals:  4 weeks  1.Report decreased    L hip    pain  <   / =  3  /10  to increase tolerance for stairs (met)  2. Increased L hip IR by 3 deg for increased functional mobility (progressing)  3. Increased L LE strength by 1/3 muscle grade in all deficient planes to increase tolerance for ADL and work activities. (met)  4. Pt will present without L piriformis tightness for improved posture and tolerance for stairs (met)  5. Pt to tolerate HEP to improve ROM and independence with ADL's (met)    Long Term Goals: 8-12 weeks (progressing)  1.Report decreased    L hip    pain  <   / =  1  /10  to increase tolerance for stairs  2. Increased L hip IR by 5 deg for increased functional mobility  3. Increased L LE strength by 1 muscle grade in all deficient planes to increase tolerance for ADL and work activities.  4. Pt to tolerate HEP to improve ROM and independence with ADL's  5. Negative Louie's test for improved TFL and IT band flexibility for improved form walking  6. Pt will report running 3-5 miles without pain.    Unmet goals continue to remain appropriate within 8 weeks.    Plan:  Continue with established Plan of Care towards PT goals. Continue to progress glute strengthening and LE flexibility as tolerated.

## 2017-08-07 ENCOUNTER — CLINICAL SUPPORT (OUTPATIENT)
Dept: REHABILITATION | Facility: HOSPITAL | Age: 47
End: 2017-08-07
Attending: ORTHOPAEDIC SURGERY
Payer: COMMERCIAL

## 2017-08-07 DIAGNOSIS — M25.552 LEFT HIP PAIN: ICD-10-CM

## 2017-08-07 NOTE — PROGRESS NOTES
Name: Fouzia Gaviria  Clinic Number: [id number]  Date of Treatment: 08/07/2017   Diagnosis:   Encounter Diagnosis   Name Primary?    Left hip pain        Visit number: 9  Start date: 06/28/17  POC End date: 09/20/17 (12 weeks)    Time in: 5:00 (patient arrived 1 hour early due to schedule mix-up)  Time Out: 6:10  Total Treatment Time: 55  1:1 time: 55    Precautions: standard    Subjective:    Patient reports her current pain in the anterior and posterior L hip to be 0/10 on a 0-10 scale with 0 being no pain and 10 being the worst pain imaginable. She states she has been compliant with ice and HEP.  She reported pressure to L anterior hip last night while trying to fall asleep.  Objective     B pelvis aligned pre- and post treatment    Fouzia  received the following manual therapy techniques x 2 min to include: long axis distraction of L hip NP     Fouzia  instructed in and performed therapeutic exercises to develop strength and endurance, flexibility for 55 minutes including:  L piriformis stretch (fig 4) supine 3x20 sec NP  L TFL stretch in supine with strap 3x20 sec  Prone quad stretch with strap and 1/2 roll under distal thigh 2 x 60 sec - relief noted  SLR 3x10 NP  SL hip abd/ext with blue sport loop 2x10 each  SL clams with TA set blue sport loop 20x3 sec  Bridges with hip abd iso blue sport loop 2x10x5 sec  Single leg bridge with TA set x10 each NP  SL (bilateral) sidelying shuttle 1 band 2x15 ea  hip abd walk red sport loop x 1 lap on orange/gray walkway   FMS squats with 1# dowel 2 x 10 NP  Hip add in standing GTB 2x10 ea NP  Elliptical x5 min, incline: 5, x5 min     Pt. Received cold pack x 10 min. To L hip/buttock     HEP Provided: pt educated to continue with current written HEP.   Pt instructed to avoid activities which increase pain and apply ice to L hip daily for decreased pain and inflammation.  Pt gave verbal understanding.  Instructed pt. Regarding: Proper technique with all exercises. Pt  theron good understanding of the education provided. Fouzia demonstrated good return demonstration of activities.    Assessment:     Pt demonstrated good tolerance to treatment reporting 0/10 pain to L hip post tx. Pt was able to tolerate elliptical today without pain, but overall fatigue noted.  Pt will continue to benefit from skilled PT intervention. Medical Necessity is demonstrated by:  Pain limits function of effected part for some activities, Unable to participate fully in daily activities and Weakness.    Patient is making good progress towards established goals.  Short Term Goals:  4 weeks  1.Report decreased    L hip    pain  <   / =  3  /10  to increase tolerance for stairs (met)  2. Increased L hip IR by 3 deg for increased functional mobility (progressing)  3. Increased L LE strength by 1/3 muscle grade in all deficient planes to increase tolerance for ADL and work activities. (met)  4. Pt will present without L piriformis tightness for improved posture and tolerance for stairs (met)  5. Pt to tolerate HEP to improve ROM and independence with ADL's (met)    Long Term Goals: 8-12 weeks (progressing)  1.Report decreased    L hip    pain  <   / =  1  /10  to increase tolerance for stairs  2. Increased L hip IR by 5 deg for increased functional mobility  3. Increased L LE strength by 1 muscle grade in all deficient planes to increase tolerance for ADL and work activities.  4. Pt to tolerate HEP to improve ROM and independence with ADL's  5. Negative Louie's test for improved TFL and IT band flexibility for improved form walking  6. Pt will report running 3-5 miles without pain.    Unmet goals continue to remain appropriate within 8 weeks.    Plan:  Continue with established Plan of Care towards PT goals. Continue to progress glute strengthening and LE flexibility as tolerated.

## 2017-08-09 ENCOUNTER — OFFICE VISIT (OUTPATIENT)
Dept: ORTHOPEDICS | Facility: CLINIC | Age: 47
End: 2017-08-09
Payer: COMMERCIAL

## 2017-08-09 ENCOUNTER — CLINICAL SUPPORT (OUTPATIENT)
Dept: REHABILITATION | Facility: HOSPITAL | Age: 47
End: 2017-08-09
Attending: ORTHOPAEDIC SURGERY
Payer: COMMERCIAL

## 2017-08-09 VITALS
SYSTOLIC BLOOD PRESSURE: 105 MMHG | DIASTOLIC BLOOD PRESSURE: 73 MMHG | HEIGHT: 62 IN | BODY MASS INDEX: 25.76 KG/M2 | HEART RATE: 75 BPM | WEIGHT: 140 LBS

## 2017-08-09 DIAGNOSIS — M70.62 TROCHANTERIC BURSITIS, LEFT HIP: Primary | ICD-10-CM

## 2017-08-09 DIAGNOSIS — M25.552 LEFT HIP PAIN: ICD-10-CM

## 2017-08-09 PROCEDURE — 3008F BODY MASS INDEX DOCD: CPT | Mod: S$GLB,,, | Performed by: ORTHOPAEDIC SURGERY

## 2017-08-09 PROCEDURE — 99999 PR PBB SHADOW E&M-EST. PATIENT-LVL III: CPT | Mod: PBBFAC,,, | Performed by: ORTHOPAEDIC SURGERY

## 2017-08-09 PROCEDURE — 99212 OFFICE O/P EST SF 10 MIN: CPT | Mod: S$GLB,,, | Performed by: ORTHOPAEDIC SURGERY

## 2017-08-09 PROCEDURE — 97110 THERAPEUTIC EXERCISES: CPT | Mod: PN

## 2017-08-09 NOTE — PROGRESS NOTES
History reviewed. No pertinent past medical history.    History reviewed. No pertinent surgical history.    No current outpatient prescriptions on file.     No current facility-administered medications for this visit.        Review of patient's allergies indicates:   Allergen Reactions    Benzodiazepines        History reviewed. No pertinent family history.    Social History     Social History    Marital status: Single     Spouse name: N/A    Number of children: N/A    Years of education: N/A     Occupational History    Not on file.     Social History Main Topics    Smoking status: Never Smoker    Smokeless tobacco: Never Used    Alcohol use No    Drug use: No    Sexual activity: Not on file     Other Topics Concern    Not on file     Social History Narrative    No narrative on file       Chief Complaint:   Chief Complaint   Patient presents with    Hip Pain     L hip 6wk f/u       Interval history: Is a 46-year-old active female seen for about 1 year of left hip pain.  Patient denies an injury or trauma.  Patient has been seeing Dr. Ventura.  Patient has pain when laying down, sitting, standing or prolonged walking.  Pain is located laterally and in the groin at times.  She had a cortisone injection back in May didn't help.  She rates her pain as a 6 out of 10.  She had an MRI done which shows partial-thickness left gluteus minimus tendon tear.  She has never had physical therapy.  The next treatment option per Dr. Ventura was PRP.    History of present illness: Physical therapy is definitely helping.  Pain is down to a 0 out of 10.  Just some mild irritation that she ices after therapy sessions.      Review of Systems:    Constitution: Negative for chills, fever, and sweats.  Negative for unexplained weight loss.    HENT:  Negative for headaches and blurry vision.    Cardiovascular:Negative for chest pain or irregular heart beat. Negative for hypertension.    Respiratory:  Negative for cough and  shortness of breath.    Gastrointestinal: Negative for abdominal pain, heartburn, melena, nausea, and vomitting.    Genitourinary:  Negative bladder incontinence and dysuria.    Musculoskeletal:  See HPI    Neurological: Negative for numbness.    Psychiatric/Behavioral: Negative for depression.  The patient is not nervous/anxious.      Endocrine: Negative for polyuria    Hematologic/Lymphatic: Negative for bleeding problem.  Does not bruise/bleed easily.    Skin: Negative for poor would healing and rash      Physical Examination:    Vital Signs:    Vitals:    08/09/17 1309   BP: 105/73   Pulse: 75       Body mass index is 25.61 kg/m².    This a well-developed, well nourished patient in no acute distress.  They are alert and oriented and cooperative to examination.  Pt. walks without an antalgic gait.      Examination of the patient's left hip shows full range of motion with flexion to 160°, extension to 0, external rotation to 50°, internal rotation of 15°, abduction of 50°, adduction of 15°. Skin has no rashes or bruising. Patient has negative Stinchfield exam. Patient has negative straight leg raise.Negative internal impingement test. Negative DAVID test. Negative Louie's test. Patient has no pain with hip range of motion.  Minimally tender to palpation over the greater trochanteric bursa. Patient is 5 out of 5 motor strength, palpable distal pulses, and intact light touch sensation.       X-rays: X-rays left hip are  reviewed which show well-maintained joint space in both hips.  Does have some arthritic changes of the lumbar spine    MRI of the left hip from [facility] dated February 3, 2017: Mild edema around the insertions of the left gluteus minimus and gluteus medius tendons.  Low-grade partial-thickness tear of the left gluteus minimus.  Mild left hamstring origin tendinosis.  L4-5 and L5-S1 degenerative disc disease.     Assessment:: Left gluteus minimus partial-thickness tearing    Plan:  I glad  that she is doing better.  Will continue with the physical therapy.  Follow-up with me as needed.    This note was created using Dragon voice recognition software that occasionally misinterpreted phrases or words.    Consult note is delivered via Epic messaging service.

## 2017-08-09 NOTE — PROGRESS NOTES
Name: Fouzia Gaviria  Clinic Number: [id number]  Date of Treatment: 08/09/2017   Diagnosis:   Encounter Diagnosis   Name Primary?    Left hip pain        Visit number: 10  Start date: 06/28/17  POC End date: 09/20/17 (12 weeks)    Time in: 4:00  Time Out: 4:59  Total Treatment Time: 55  1:1 time: 30    Precautions: standard    Subjective:    Patient reports no current pain, but mild pressure in her L lateral hip. Rates her current pain in the anterior and posterior L hip to be 0/10 on a 0-10 scale with 0 being no pain and 10 being the worst pain imaginable. She states she saw her doctor yesterday and was told she did not need to return, and could finish up her current PT and be discharged.    Objective     B pelvis aligned pre- and post treatment    Fouzia  received the following manual therapy techniques x 2 min to include: long axis distraction of L hip NP     Fouzia  instructed in and performed therapeutic exercises to develop strength and endurance, flexibility for 55 minutes including:  L piriformis stretch (fig 4) supine 3x20 sec NP  L TFL stretch in supine with strap 3x20 sec  Prone quad stretch with strap and 1/2 roll under distal thigh 2 x 60 sec - relief noted  SLR 3x10 NP  SL hip abd/ext with blue sport loop 2x10 each  SL clams with TA set blue sport loop 30x3 sec  Bridges with hip abd iso blue sport loop 3x10x5 sec  Single leg bridge with TA set 2x5 each   SL (bilateral) sidelying shuttle 1 band 2x15 ea  hip abd walk red sport loop x 1 lap on orange/gray walkway   FMS squats with 1# dowel 2 x 10 NP  Hip abd/ext with Red sport loop 2x10 each leg  Hip add in standing GTB 2x10 ea NP  Elliptical x5 min, incline: 5     Pt. Received cold pack x 10 min. To L hip/buttock (pt deferred to home)     HEP Provided: pt educated to continue with current written HEP.   Pt instructed to avoid activities which increase pain and apply ice to L hip daily for decreased pain and inflammation.  Pt gave verbal  understanding. Instructed pt. Regarding: Proper technique with all exercises. Pt demo good understanding of the education provided. Fouzia demonstrated good return demonstration of activities.    Assessment:     Fouzia tolerated treatment well this date without onset of pain or soreness in the hip. She presents with improved core and LE strength, yet continued fatigue of B hip abductors with exercises. She reported muscle fatigue, but 0/10 pain to L hip post tx. Pt will continue to benefit from skilled PT intervention. Medical Necessity is demonstrated by:  Pain limits function of effected part for some activities, Unable to participate fully in daily activities and Weakness.    Patient is making good progress towards established goals.  Short Term Goals:  4 weeks  1.Report decreased    L hip    pain  <   / =  3  /10  to increase tolerance for stairs (met)  2. Increased L hip IR by 3 deg for increased functional mobility (progressing)  3. Increased L LE strength by 1/3 muscle grade in all deficient planes to increase tolerance for ADL and work activities. (met)  4. Pt will present without L piriformis tightness for improved posture and tolerance for stairs (met)  5. Pt to tolerate HEP to improve ROM and independence with ADL's (met)    Long Term Goals: 8-12 weeks (progressing)  1.Report decreased    L hip    pain  <   / =  1  /10  to increase tolerance for stairs  2. Increased L hip IR by 5 deg for increased functional mobility  3. Increased L LE strength by 1 muscle grade in all deficient planes to increase tolerance for ADL and work activities.  4. Pt to tolerate HEP to improve ROM and independence with ADL's  5. Negative Louie's test for improved TFL and IT band flexibility for improved form walking  6. Pt will report running 3-5 miles without pain.    Unmet goals continue to remain appropriate within 8 weeks.    Plan:  Continue with established Plan of Care towards PT goals. Continue to progress glute  strengthening and LE flexibility as tolerated.

## 2017-08-14 ENCOUNTER — CLINICAL SUPPORT (OUTPATIENT)
Dept: REHABILITATION | Facility: HOSPITAL | Age: 47
End: 2017-08-14
Attending: ORTHOPAEDIC SURGERY
Payer: COMMERCIAL

## 2017-08-14 DIAGNOSIS — M25.552 LEFT HIP PAIN: ICD-10-CM

## 2017-08-14 PROCEDURE — 97110 THERAPEUTIC EXERCISES: CPT | Mod: PN

## 2017-08-14 NOTE — PROGRESS NOTES
Name: Fouzia Gaviria  Clinic Number: [id number]  Date of Treatment: 08/14/2017   Diagnosis:   Encounter Diagnosis   Name Primary?    Left hip pain        Visit number: 11  Start date: 06/28/17  POC End date: 09/20/17 (12 weeks)    Time in: 4:00  Time Out: 4:59  Total Treatment Time: 55  1:1 time: 30    Precautions: standard    Subjective:    Patient reports  0/10  Pain todayon a 0-10 scale with 0 being no pain and 10 being the worst pain imaginable. Pt states she is able to ascend/descend 3 flights of stairs without pain.    Objective     B pelvis aligned pre- and post treatment     Fouzia  instructed in and performed therapeutic exercises to develop strength and endurance, flexibility for 55 minutes including:  L piriformis stretch (fig 4) supine 3x20 sec NP  L TFL stretch in supine with strap 3x20 sec  Prone quad stretch with strap and 1/2 roll under distal thigh 2 x 60 sec - relief noted  SLR 3x10 NP  SL hip abd/ext with blue sport loop 2x10 each  SL clams with TA set blue sport loop 30x3 sec  Bridges with hip abd iso blue sport loop 3x10x5 sec  Single leg bridge with TA set 2x5 each   SL (bilateral) sidelying shuttle 1 band 2x15 ea  hip abd walk red sport loop x 1 lap on orange/gray walkway   FMS squats with 1# dowel 2 x 10 NP  Hip add in standing GTB 2x10 ea NP  Elliptical x6 min, incline: 5     Pt. Received cold pack x 10 min. To L hip/buttock      HEP Provided: pt educated to continue with current written HEP.   Pt instructed she can perform elliptical at home and gradually increase time for increased endurance. Pt demo good understanding of the education provided. Fouzia demonstrated good return demonstration of activities.    Assessment:     Fouzia tolerated treatment well reporting 0/10 pain to L hip post tx.  She continues to improve with increased overall strength and tolerance for elliptical.  Pt will continue to benefit from skilled PT intervention. Medical Necessity is demonstrated by:  Pain  limits function of effected part for some activities, Unable to participate fully in daily activities and Weakness.    Patient is making good progress towards established goals.  Short Term Goals:  4 weeks  1.Report decreased    L hip    pain  <   / =  3  /10  to increase tolerance for stairs (met)  2. Increased L hip IR by 3 deg for increased functional mobility (progressing)  3. Increased L LE strength by 1/3 muscle grade in all deficient planes to increase tolerance for ADL and work activities. (met)  4. Pt will present without L piriformis tightness for improved posture and tolerance for stairs (met)  5. Pt to tolerate HEP to improve ROM and independence with ADL's (met)    Long Term Goals: 8-12 weeks (progressing)  1.Report decreased    L hip    pain  <   / =  1  /10  to increase tolerance for stairs  2. Increased L hip IR by 5 deg for increased functional mobility  3. Increased L LE strength by 1 muscle grade in all deficient planes to increase tolerance for ADL and work activities.  4. Pt to tolerate HEP to improve ROM and independence with ADL's  5. Negative Louie's test for improved TFL and IT band flexibility for improved form walking  6. Pt will report running 3-5 miles without pain.    Unmet goals continue to remain appropriate within 8 weeks.    Plan:  Continue with established Plan of Care towards PT goals. Continue to progress glute strengthening and LE flexibility as tolerated.  Work towards running goal.

## 2017-08-16 ENCOUNTER — CLINICAL SUPPORT (OUTPATIENT)
Dept: REHABILITATION | Facility: HOSPITAL | Age: 47
End: 2017-08-16
Attending: ORTHOPAEDIC SURGERY
Payer: COMMERCIAL

## 2017-08-16 DIAGNOSIS — M25.552 LEFT HIP PAIN: ICD-10-CM

## 2017-08-16 PROCEDURE — 97110 THERAPEUTIC EXERCISES: CPT | Mod: PN

## 2017-08-16 NOTE — PROGRESS NOTES
Name: Fouzia Gaviria  [facility] Number: [id number]  Date of Treatment: 08/16/2017   Diagnosis:   Encounter Diagnosis   Name Primary?    Left hip pain        Visit number: 12  Start date: 06/28/17  POC End date: 09/20/17 (12 weeks)    Time in: 4:10  Time Out: 5:00  Total Treatment Time: 50  1:1 time: 30    Precautions: standard    Subjective:    Patient reports  0/10  Pain todayon a 0-10 scale with 0 being no pain and 10 being the worst pain imaginable.     Objective     B pelvis aligned pre- and post treatment     Fouzia  instructed in and performed therapeutic exercises to develop strength and endurance, flexibility for 55 minutes including:  L piriformis stretch (fig 4) supine 3x20 sec NP  L TFL stretch in supine with strap 3x20 sec  Prone quad stretch with strap and 1/2 roll under distal thigh 2 x 60 sec - relief noted  SLR 3x10 [id number] sec  SL hip abd/ext with blue sport loop 2x10 each  SL clams with TA set blue sport loop 30x3 sec  Bridges with hip abd iso blue sport loop   Single leg bridge with TA set 3x5 each   SL (bilateral) sidelying shuttle 1 band 2x15 ea  hip abd walk red sport loop x 2 laps on orange/gray walkway   FMS squats with 1# dowel 2 x 10 NP  Hip add in standing GTB 2x10 ea NP  Elliptical x7 min, incline: 5     Pt. Received cold pack x 10 min. To L hip/buttock      HEP Provided: pt educated to continue with current written HEP.   Pt instructed she can perform elliptical at home and gradually increase time for increased endurance. Pt demo good understanding of the education provided. Fouzia demonstrated good return demonstration of activities.    Assessment    Pt demonstrated good tolerance to treatment this date without onset of soreness or pain in the L hip. She reports 0/10 pain to L hip post tx. LE strength and endurance continues to improve. Pt is compliant with HEP and motivated to return to prior level of activity. Pt will continue to benefit from skilled PT intervention. Medical  Necessity is demonstrated by:  Pain limits function of effected part for some activities, Unable to participate fully in daily activities and Weakness.    Patient is making good progress towards established goals.  Short Term Goals:  4 weeks  1.Report decreased    L hip    pain  <   / =  3  /10  to increase tolerance for stairs (met)  2. Increased L hip IR by 3 deg for increased functional mobility (progressing)  3. Increased L LE strength by 1/3 muscle grade in all deficient planes to increase tolerance for ADL and work activities. (met)  4. Pt will present without L piriformis tightness for improved posture and tolerance for stairs (met)  5. Pt to tolerate HEP to improve ROM and independence with ADL's (met)    Long Term Goals: 8-12 weeks (progressing)  1.Report decreased    L hip    pain  <   / =  1  /10  to increase tolerance for stairs  2. Increased L hip IR by 5 deg for increased functional mobility  3. Increased L LE strength by 1 muscle grade in all deficient planes to increase tolerance for ADL and work activities.  4. Pt to tolerate HEP to improve ROM and independence with ADL's  5. Negative Louie's test for improved TFL and IT band flexibility for improved form walking  6. Pt will report running 3-5 miles without pain.    Unmet goals continue to remain appropriate within 8 weeks.    Plan:  Continue with established Plan of Care towards PT goals. Continue to progress glute strengthening and LE flexibility as tolerated.  Work towards running goal.

## 2019-09-12 DIAGNOSIS — F98.8 ATTENTION DEFICIT DISORDER, UNSPECIFIED HYPERACTIVITY PRESENCE: Primary | ICD-10-CM

## 2019-09-12 RX ORDER — LISDEXAMFETAMINE DIMESYLATE 50 MG/1
1 CAPSULE ORAL DAILY
Refills: 0 | COMMUNITY
Start: 2019-07-24 | End: 2019-09-12 | Stop reason: SDUPTHER

## 2019-09-12 RX ORDER — LISDEXAMFETAMINE DIMESYLATE 50 MG/1
50 CAPSULE ORAL DAILY
Qty: 30 CAPSULE | Refills: 0 | Status: SHIPPED | OUTPATIENT
Start: 2019-09-12 | End: 2019-10-15 | Stop reason: SDUPTHER

## 2019-09-12 NOTE — TELEPHONE ENCOUNTER
----- Message from Marciano Dolan sent at 9/12/2019 12:03 PM CDT -----  Vyvanse 50Mg Capsule send to [facility] in [place]  Pt# [phone]

## 2019-10-15 DIAGNOSIS — F98.8 ATTENTION DEFICIT DISORDER, UNSPECIFIED HYPERACTIVITY PRESENCE: ICD-10-CM

## 2019-10-15 RX ORDER — LISDEXAMFETAMINE DIMESYLATE 50 MG/1
50 CAPSULE ORAL DAILY
Qty: 30 CAPSULE | Refills: 0 | Status: SHIPPED | OUTPATIENT
Start: 2019-10-15 | End: 2019-12-09

## 2019-10-15 RX ORDER — LISDEXAMFETAMINE DIMESYLATE 50 MG/1
50 CAPSULE ORAL DAILY
Qty: 30 CAPSULE | Refills: 0 | Status: SHIPPED | OUTPATIENT
Start: 2019-12-14 | End: 2019-12-09

## 2019-10-15 RX ORDER — LISDEXAMFETAMINE DIMESYLATE 50 MG/1
50 CAPSULE ORAL DAILY
Qty: 30 CAPSULE | Refills: 0 | Status: SHIPPED | OUTPATIENT
Start: 2019-11-14 | End: 2019-12-09

## 2019-10-15 NOTE — TELEPHONE ENCOUNTER
----- Message from Michelle Rowe sent at 10/15/2019  7:54 AM CDT -----  Needs refills [phone]

## 2019-10-24 ENCOUNTER — TELEPHONE (OUTPATIENT)
Dept: FAMILY MEDICINE | Facility: CLINIC | Age: 49
End: 2019-10-24

## 2019-10-24 DIAGNOSIS — R79.89 ABNORMAL CBC: Primary | ICD-10-CM

## 2019-10-24 NOTE — TELEPHONE ENCOUNTER
From [facility] action:   BEVERLY LIVE 7/23/2019 3:48:56 PM > yes, recheck ferritin and cbc in 3 months.    LMOR for patient that lab is due to recheck iron levels and orders are at [facility]. Ok on [facility] hipaa to leave messages. Orders pended

## 2019-10-30 LAB
BASOPHILS # BLD AUTO: 71 CELLS/UL (ref 0–200)
BASOPHILS NFR BLD AUTO: 1.5 %
EOSINOPHIL # BLD AUTO: 132 CELLS/UL (ref 15–500)
EOSINOPHIL NFR BLD AUTO: 2.8 %
ERYTHROCYTE [DISTWIDTH] IN BLOOD BY AUTOMATED COUNT: 13.6 % (ref 11–15)
FERRITIN SERPL-MCNC: 34 NG/ML (ref 16–232)
HCT VFR BLD AUTO: 36 % (ref 35–45)
HGB BLD-MCNC: 12.2 G/DL (ref 11.7–15.5)
LYMPHOCYTES # BLD AUTO: 1598 CELLS/UL (ref 850–3900)
LYMPHOCYTES NFR BLD AUTO: 34 %
MCH RBC QN AUTO: 32.5 PG (ref 27–33)
MCHC RBC AUTO-ENTMCNC: 33.9 G/DL (ref 32–36)
MCV RBC AUTO: 96 FL (ref 80–100)
MONOCYTES # BLD AUTO: 498 CELLS/UL (ref 200–950)
MONOCYTES NFR BLD AUTO: 10.6 %
NEUTROPHILS # BLD AUTO: 2402 CELLS/UL (ref 1500–7800)
NEUTROPHILS NFR BLD AUTO: 51.1 %
PLATELET # BLD AUTO: 213 THOUSAND/UL (ref 140–400)
PMV BLD REES-ECKER: 10.9 FL (ref 7.5–12.5)
RBC # BLD AUTO: 3.75 MILLION/UL (ref 3.8–5.1)
WBC # BLD AUTO: 4.7 THOUSAND/UL (ref 3.8–10.8)

## 2019-11-01 ENCOUNTER — PATIENT MESSAGE (OUTPATIENT)
Dept: FAMILY MEDICINE | Facility: CLINIC | Age: 49
End: 2019-11-01

## 2019-11-03 NOTE — TELEPHONE ENCOUNTER
I resulted and sent her a copy to the portal. Her cbc and ferritin looked good. Iron deficiency well controlled.

## 2019-12-03 ENCOUNTER — PATIENT MESSAGE (OUTPATIENT)
Dept: FAMILY MEDICINE | Facility: CLINIC | Age: 49
End: 2019-12-03

## 2019-12-04 ENCOUNTER — PATIENT MESSAGE (OUTPATIENT)
Dept: FAMILY MEDICINE | Facility: CLINIC | Age: 49
End: 2019-12-04

## 2019-12-09 ENCOUNTER — PATIENT MESSAGE (OUTPATIENT)
Dept: FAMILY MEDICINE | Facility: CLINIC | Age: 49
End: 2019-12-09

## 2019-12-09 DIAGNOSIS — F98.8 ATTENTION DEFICIT DISORDER, UNSPECIFIED HYPERACTIVITY PRESENCE: ICD-10-CM

## 2019-12-09 RX ORDER — LISDEXAMFETAMINE DIMESYLATE 50 MG/1
50 CAPSULE ORAL DAILY
Qty: 30 CAPSULE | Refills: 0 | Status: SHIPPED | OUTPATIENT
Start: 2019-12-09 | End: 2020-01-24 | Stop reason: SDUPTHER

## 2020-01-24 ENCOUNTER — PATIENT MESSAGE (OUTPATIENT)
Dept: FAMILY MEDICINE | Facility: CLINIC | Age: 50
End: 2020-01-24

## 2020-01-24 DIAGNOSIS — F98.8 ATTENTION DEFICIT DISORDER, UNSPECIFIED HYPERACTIVITY PRESENCE: ICD-10-CM

## 2020-01-26 RX ORDER — LISDEXAMFETAMINE DIMESYLATE 50 MG/1
50 CAPSULE ORAL DAILY
Qty: 30 CAPSULE | Refills: 0 | Status: SHIPPED | OUTPATIENT
Start: 2020-01-26 | End: 2020-01-27 | Stop reason: SDUPTHER

## 2020-01-27 ENCOUNTER — PATIENT MESSAGE (OUTPATIENT)
Dept: FAMILY MEDICINE | Facility: CLINIC | Age: 50
End: 2020-01-27

## 2020-01-27 DIAGNOSIS — F98.8 ATTENTION DEFICIT DISORDER, UNSPECIFIED HYPERACTIVITY PRESENCE: ICD-10-CM

## 2020-01-28 RX ORDER — LISDEXAMFETAMINE DIMESYLATE 50 MG/1
50 CAPSULE ORAL DAILY
Qty: 30 CAPSULE | Refills: 0 | Status: SHIPPED | OUTPATIENT
Start: 2020-01-28 | End: 2020-04-21 | Stop reason: SDUPTHER

## 2020-04-20 ENCOUNTER — PATIENT MESSAGE (OUTPATIENT)
Dept: FAMILY MEDICINE | Facility: CLINIC | Age: 50
End: 2020-04-20

## 2020-04-21 ENCOUNTER — TELEPHONE (OUTPATIENT)
Dept: FAMILY MEDICINE | Facility: CLINIC | Age: 50
End: 2020-04-21

## 2020-04-21 ENCOUNTER — PATIENT MESSAGE (OUTPATIENT)
Dept: FAMILY MEDICINE | Facility: CLINIC | Age: 50
End: 2020-04-21

## 2020-04-21 ENCOUNTER — OFFICE VISIT (OUTPATIENT)
Dept: FAMILY MEDICINE | Facility: CLINIC | Age: 50
End: 2020-04-21
Payer: COMMERCIAL

## 2020-04-21 DIAGNOSIS — F98.8 ATTENTION DEFICIT DISORDER, UNSPECIFIED HYPERACTIVITY PRESENCE: ICD-10-CM

## 2020-04-21 DIAGNOSIS — Z12.31 SCREENING MAMMOGRAM, ENCOUNTER FOR: ICD-10-CM

## 2020-04-21 DIAGNOSIS — Z00.00 ROUTINE GENERAL MEDICAL EXAMINATION AT A HEALTH CARE FACILITY: ICD-10-CM

## 2020-04-21 DIAGNOSIS — F33.41 RECURRENT MAJOR DEPRESSIVE DISORDER, IN PARTIAL REMISSION: Primary | ICD-10-CM

## 2020-04-21 PROCEDURE — 99213 OFFICE O/P EST LOW 20 MIN: CPT | Mod: 95,,, | Performed by: NURSE PRACTITIONER

## 2020-04-21 PROCEDURE — 99213 PR OFFICE/OUTPT VISIT, EST, LEVL III, 20-29 MIN: ICD-10-PCS | Mod: 95,,, | Performed by: NURSE PRACTITIONER

## 2020-04-21 RX ORDER — LISDEXAMFETAMINE DIMESYLATE 50 MG/1
50 CAPSULE ORAL DAILY
Qty: 30 CAPSULE | Refills: 0 | Status: SHIPPED | OUTPATIENT
Start: 2020-05-21 | End: 2020-07-07 | Stop reason: SDUPTHER

## 2020-04-21 RX ORDER — LISDEXAMFETAMINE DIMESYLATE 50 MG/1
50 CAPSULE ORAL DAILY
Qty: 30 CAPSULE | Refills: 0 | Status: SHIPPED | OUTPATIENT
Start: 2020-04-21 | End: 2020-07-07 | Stop reason: SDUPTHER

## 2020-04-21 RX ORDER — BUPROPION HYDROCHLORIDE 300 MG/1
300 TABLET ORAL DAILY
Qty: 90 TABLET | Refills: 1 | Status: SHIPPED | OUTPATIENT
Start: 2020-04-21 | End: 2020-10-22 | Stop reason: SDUPTHER

## 2020-04-21 RX ORDER — VALACYCLOVIR HYDROCHLORIDE 1 G/1
1000 TABLET, FILM COATED ORAL DAILY
COMMUNITY
End: 2020-10-22 | Stop reason: SDUPTHER

## 2020-04-21 RX ORDER — BUPROPION HYDROCHLORIDE 300 MG/1
300 TABLET ORAL DAILY
COMMUNITY
End: 2020-04-21 | Stop reason: SDUPTHER

## 2020-04-21 RX ORDER — LISDEXAMFETAMINE DIMESYLATE 50 MG/1
50 CAPSULE ORAL DAILY
Qty: 30 CAPSULE | Refills: 0 | Status: SHIPPED | OUTPATIENT
Start: 2020-06-20 | End: 2020-07-07 | Stop reason: SDUPTHER

## 2020-04-21 NOTE — TELEPHONE ENCOUNTER
----- Message from Kasandra Saravia NP sent at 4/21/2020 10:58 AM CDT -----  Needs f/u visit in 6 months with Dr. Law   FAMILY PRACTICE OFFICE VISIT  [facility] [address] [address]  [address], [address]      NAME: Nini Forde  AGE: 37 y o  SEX: male  : 1975   MRN: [id number]    DATE: 2018  TIME: 12:48 PM    Assessment and Plan     Problem List Items Addressed This Visit        Endocrine    Uncontrolled type 2 diabetes mellitus with hyperglycemia, with long-term current use of insulin (HCC) - Primary    Relevant Medications    metFORMIN (GLUCOPHAGE) 500 mg tablet    atorvastatin (LIPITOR) 10 mg tablet    insulin detemir (LEVEMIR FLEXTOUCH) 100 Units/mL injection pen    Insulin Pen Needle (PEN NEEDLES 31GX5/16") 31G X 8 MM MISC    Other Relevant Orders    POCT hemoglobin A1c (Completed)    POCT blood glucose (Completed)    CBC    Comprehensive metabolic panel    Urinalysis with microscopic    Lipid panel    Glucometer    Glucometer test strips    Lancets       Other    Abscess of abdominal wall    Relevant Orders    Ambulatory referral to General Surgery    Current smoker    Hypercholesterolemia    Relevant Medications    atorvastatin (LIPITOR) 10 mg tablet    Microalbuminuria          Patient Instructions   We reviewed his emergency room visit, still with very significant abscess abdominal wall, fortunately is afebrile, he will stay on Bactrim/antibiotic, we will set him up with general surgeon  We reviewed his sugars are far too high, uncontrolled diabetes, well aware of risks associated with this including heart disease, kidney damage, eye damage along with neuropathy  I would like him to restart his insulin at prior dose of 24 U daily, glucometer reading here now 392, A1c 11 8  Slip given for blood work along with urinalysis  He will also restart metformin twice daily with meals    He received prescription for hydrochlorothiazide 12 5 mg daily at the emergency room, blood pressure here today is much lower than prior reading, stay off lisinopril for now  I did send prescription to restart atorvastatin  I would like to see him again in 1 week, call us sooner if needed  He wishes to return to work today  He should use glucometer at least twice daily, call us if drops less than 70 or climbs over 450  Plan future diabetic education  He continues to smoke 1 pack per day, well aware of risk lung cancer etc, really needs to taper down and quit, consider future Chantix  Chief Complaint     Chief Complaint   Patient presents with    Physical Exam       History of Present Illness   Corby Holder is a 37y o -year-old male who noted abscess abdominal wall 4 to 5 days ago, presented to emergency room 2 days ago, underwent I&D, placed on Bactrim  Is somewhat improved but still significant swelling with drainage  He did remove wick on own  He has not yet set up follow-up with surgeon as emergency room advise  Also had ER alysa w this earlier in year  No BW was done  Blood pressure was very high at emergency room visit    We had last seen him here back in 2016, he relates he lost insurance, now working for [employer] and has coverage  He has not been using any medication, has not been checking his sugars  Continues to smoke 1 pack per day  4 days ago he did undergo dental extraction, was placed on Amoxil for that  Review of Systems   Review of Systems   Constitutional: Positive for unexpected weight change (His weight is down over 25 lb versus last time we saw him, he relates most of that weight loss has been this year)  Negative for chills and fatigue  Eyes: Negative for visual disturbance  Respiratory: Negative for cough, chest tightness, shortness of breath and wheezing  Cardiovascular: Negative for chest pain, palpitations and leg swelling  Gastrointestinal: Positive for constipation (BM Q 2 to 3 days)  Negative for nausea and vomiting          No other abdominal pain, no acid reflux   Genitourinary: Positive for frequency  Negative for dysuria and hematuria  Musculoskeletal:        Pain left hand   Neurological: Positive for headaches (Slight)  Negative for syncope and speech difficulty  In the past he had occasional dizziness, none recent   Hematological: Does not bruise/bleed easily  Psychiatric/Behavioral: Negative for behavioral problems and confusion  Active Problem List     Patient Active Problem List   Diagnosis    Hypercholesterolemia    Microalbuminuria    Uncontrolled type 2 diabetes mellitus with hyperglycemia, with long-term current use of insulin (HCC)    Abscess of abdominal wall    Current smoker       Past Medical History:  Past Medical History:   Diagnosis Date    Diabetes mellitus ([facility] [address] )     Hypertension        Past Surgical History:  No past surgical history on file  Family History:  No family history on file  Social History:  Social History     Social History    Marital status: Single     Spouse name: N/A    Number of children: N/A    Years of education: N/A     Occupational History    Not on file  Social History Main Topics    Smoking status: Current Every Day Smoker     Packs/day: 1 00     Types: Cigarettes    Smokeless tobacco: Never Used    Alcohol use Yes      Comment: socially    Drug use: No    Sexual activity: Not on file     Other Topics Concern    Not on file     Social History Narrative    No narrative on file       Objective     Vitals:    09/11/18 1005   BP: 110/78   BP Location: Left arm   Patient Position: Sitting   Cuff Size: Large   Pulse: (!) 108   SpO2: 98%   Weight: 82 2 kg (181 lb 3 2 oz)   Height: 5' 8 4" (1 737 m)     Body mass index is 27 23 kg/m²      BP Readings from Last 3 Encounters:   09/11/18 110/78   09/09/18 (!) 203/104   03/17/18 165/83       Wt Readings from Last 3 Encounters:   09/11/18 82 2 kg (181 lb 3 2 oz)   09/09/18 85 6 kg (188 lb 12 8 oz)   04/27/17 96 1 kg (211 lb 13 8 oz)       Physical Exam   Constitutional: He is oriented to person, place, and time  He appears well-developed and well-nourished  Pleasant 37year-old, no acute distress other than he is complaining of discomfort associated with abdominal wall abscess, afebrile, not orthostatic   Eyes: Conjunctivae are normal  No scleral icterus  Neck: Neck supple  Cardiovascular: Normal rate, regular rhythm and normal heart sounds  No murmur heard  Pulmonary/Chest: Effort normal and breath sounds normal  No respiratory distress  He has no wheezes  He has no rales  Abdominal:   over 10 cm firm mass midline mid upper abdomen/wall, incision with small amount purulent drainage, no surrounding redness or warmth, rest of abdomen is soft and nontender  Small amount drainage on dressing from this morning, redressed here  Musculoskeletal: He exhibits no edema  Lymphadenopathy:     He has no cervical adenopathy  Neurological: He is alert and oriented to person, place, and time  Psychiatric: He has a normal mood and affect   His behavior is normal        ALLERGIES:  No Known Allergies    Current Medications     Current Outpatient Prescriptions   Medication Sig Dispense Refill    atorvastatin (LIPITOR) 10 mg tablet Take 1 tablet (10 mg total) by mouth daily 30 tablet 3    hydrochlorothiazide (HYDRODIURIL) 12 5 mg tablet Take 1 tablet (12 5 mg total) by mouth daily 30 tablet 0    metFORMIN (GLUCOPHAGE) 500 mg tablet Take 1 tablet (500 mg total) by mouth 2 (two) times a day with meals 60 tablet 3    sulfamethoxazole-trimethoprim (BACTRIM DS) 800-160 mg per tablet Take 1 tablet by mouth 2 (two) times a day for 10 days smx-tmp DS (BACTRIM) 800-160 mg tabs (1tab q12 D10) 20 tablet 0    acetaminophen (TYLENOL) 500 mg tablet Take 1 tablet (500 mg total) by mouth every 6 (six) hours as needed for mild pain, moderate pain, headaches or fever 30 tablet 0    insulin detemir (LEVEMIR FLEXTOUCH) 100 Units/mL injection pen Inject 24 Units under the skin daily 5 pen 1    Insulin Pen Needle (PEN NEEDLES 31GX5/16") 31G X 8 MM MISC Inject as directed daily 100 each 1     No current facility-administered medications for this visit                Most recent labs available from [address]   ( others may be available in Care Everywhere / Media sections)  Lab Results   Component Value Date    WBC 12 65 (H) 03/10/2015    HGB 18 5 (H) 03/10/2015    HCT 50 6 (H) 03/10/2015     03/10/2015    ALT 16 03/10/2015    AST 10 03/10/2015     09/22/2015    K 4 3 09/22/2015     09/22/2015    CREATININE 0 84 09/22/2015    BUN 11 09/22/2015    CO2 28 09/22/2015    INR 0 92 03/10/2015    HGBA1C 11 8 09/11/2018     No results found for: [facility]  Lab Results   Component Value Date    [id number] 1 459 03/10/2015         Orders Placed This Encounter   Procedures    Glucometer    Glucometer test strips    Lancets    CBC    Comprehensive metabolic panel    Urinalysis with microscopic    Lipid panel    Ambulatory referral to General Surgery    POCT hemoglobin A1c    POCT blood glucose         Haydee Licona DO

## 2020-04-21 NOTE — PROGRESS NOTES
Subjective:        The chief complaint leading to consultation is: ADHD/depression f/u  The patient location is:  Home  Visit type: Virtual visit with synchronous audio/video or audio only  This was a video visit in lieu of in-person visit due to the coronavirus emergency. Patient acknowledged and consented to the video visit encounter.     Pt reports overall doing well. Last visit her in 2019 with Christiano.  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 Denies c/o's today. Doing well on wellbutrin and vyvanse- no issues reported.  Sees Dr. Garcia, MARIE with pap though admits she's overdue for mammogram  Tries to walk regularly for exercise- can no longer run d/t hip bursitis      Past Surgical History:   Procedure Laterality Date     SECTION      x3    LUMBAR FUSION       Past Medical History:   Diagnosis Date    ADHD (attention deficit hyperactivity disorder)     Depression      History reviewed. No pertinent family history.     Social History:   Marital Status:   Alcohol History:  reports that she drinks alcohol.  Tobacco History:  reports that she has quit smoking. She has never used smokeless tobacco.  Drug History:  reports that she does not use drugs.    Review of patient's allergies indicates:   Allergen Reactions    Benzodiazepines        Current Outpatient Medications   Medication Sig Dispense Refill    buPROPion (WELLBUTRIN XL) 300 MG 24 hr tablet Take 1 tablet (300 mg total) by mouth once daily. 90 tablet 1    valACYclovir (VALTREX) 1000 MG tablet Take 1,000 mg by mouth once daily.      VYVANSE 50 mg capsule Take 1 capsule (50 mg total) by mouth once daily. 30 capsule 0    [START ON 2020] VYVANSE 50 mg capsule Take 1 capsule (50 mg total) by mouth once daily. 30 capsule 0    [START ON 2020] VYVANSE 50 mg capsule Take 1 capsule (50 mg total) by mouth once daily. 30 capsule 0     No current facility-administered medications for this  visit.        Review of Systems   Constitutional: Negative for chills and fever.   Respiratory: Negative for cough, shortness of breath and wheezing.    Cardiovascular: Negative for chest pain, palpitations and leg swelling.   Gastrointestinal: Negative for abdominal pain, constipation and diarrhea.   Genitourinary: Negative for dysuria and hematuria.   Musculoskeletal: Positive for arthralgias (occasional hip pain).   Skin: Negative for rash.   Neurological: Negative for dizziness and headaches.   Psychiatric/Behavioral: Negative for decreased concentration (well controlled on med), dysphoric mood (doing well on med) and suicidal ideas. The patient is nervous/anxious.          Objective:        Physical Exam:   Physical Exam   Constitutional: She is oriented to person, place, and time. She appears well-developed and well-nourished.   Neurological: She is alert and oriented to person, place, and time.   Psychiatric: She has a normal mood and affect.            Assessment:       1. Recurrent major depressive disorder, in partial remission    2. Attention deficit disorder, unspecified hyperactivity presence    3. Screening mammogram, encounter for    4. Routine general medical examination at a health care facility      Plan:   Recurrent major depressive disorder, in partial remission  -     buPROPion (WELLBUTRIN XL) 300 MG 24 hr tablet; Take 1 tablet (300 mg total) by mouth once daily.  Dispense: 90 tablet; Refill: 1  -stable on med    Attention deficit disorder, unspecified hyperactivity presence  -     VYVANSE 50 mg capsule; Take 1 capsule (50 mg total) by mouth once daily.  Dispense: 30 capsule; Refill: 0  -     VYVANSE 50 mg capsule; Take 1 capsule (50 mg total) by mouth once daily.  Dispense: 30 capsule; Refill: 0  -     VYVANSE 50 mg capsule; Take 1 capsule (50 mg total) by mouth once daily.  Dispense: 30 capsule; Refill: 0  -doing well, no issues with appetite/weight    Screening mammogram, encounter for  -      Mammo Digital Screening Bilat; Future; Expected date: 04/28/2020    Routine general medical examination at a health care facility  -     CBC auto differential; Future; Expected date: 04/21/2020  -     Comprehensive metabolic panel; Future; Expected date: 04/21/2020  -     Lipid panel; Future; Expected date: 04/21/2020  -     TSH w/reflex to FT4; Future; Expected date: 04/21/2020  -     Microalbumin/creatinine urine ratio; Future  -     Urinalysis, Reflex to Urine Culture Urine, Clean Catch; Future  -advised to have annual labs drawn once COVID19 restrictions lifted    Follow up in about 6 months (around 10/21/2020) for ADHD, Dr. Law next visit; labs within next 1-2 months.    Total time spent with patient: 20    Each patient to whom he or she provides medical services by telemedicine is:  (1) informed of the relationship between the physician and patient and the respective role of any other health care provider with respect to management of the patient; and (2) notified that he or she may decline to receive medical services by telemedicine and may withdraw from such care at any time.    This note was created using iClinical voice recognition software that occasionally misinterprets phrases or words.

## 2020-06-02 ENCOUNTER — HOSPITAL ENCOUNTER (OUTPATIENT)
Dept: RADIOLOGY | Facility: HOSPITAL | Age: 50
Discharge: HOME | End: 2020-06-02
Attending: NURSE PRACTITIONER
Payer: COMMERCIAL

## 2020-06-02 VITALS — WEIGHT: 140 LBS | BODY MASS INDEX: 25.76 KG/M2 | HEIGHT: 62 IN

## 2020-06-02 DIAGNOSIS — Z12.31 SCREENING MAMMOGRAM, ENCOUNTER FOR: ICD-10-CM

## 2020-06-02 PROCEDURE — 77067 SCR MAMMO BI INCL CAD: CPT | Mod: TC,PO

## 2020-06-21 ENCOUNTER — HOSPITAL ENCOUNTER (EMERGENCY)
Facility: HOSPITAL | Age: 50
Discharge: HOME | End: 2020-06-21
Attending: EMERGENCY MEDICINE
Payer: COMMERCIAL

## 2020-06-21 VITALS
BODY MASS INDEX: 26.68 KG/M2 | RESPIRATION RATE: 16 BRPM | OXYGEN SATURATION: 100 % | SYSTOLIC BLOOD PRESSURE: 130 MMHG | HEIGHT: 62 IN | WEIGHT: 145 LBS | TEMPERATURE: 98 F | HEART RATE: 68 BPM | DIASTOLIC BLOOD PRESSURE: 74 MMHG

## 2020-06-21 DIAGNOSIS — M25.512 LEFT SHOULDER PAIN, UNSPECIFIED CHRONICITY: Primary | ICD-10-CM

## 2020-06-21 DIAGNOSIS — R52 PAIN: ICD-10-CM

## 2020-06-21 DIAGNOSIS — R07.81 RIB PAIN: ICD-10-CM

## 2020-06-21 PROCEDURE — 25000003 PHARM REV CODE 250: Performed by: EMERGENCY MEDICINE

## 2020-06-21 PROCEDURE — 99284 EMERGENCY DEPT VISIT MOD MDM: CPT | Mod: 25

## 2020-06-21 RX ORDER — METHOCARBAMOL 500 MG/1
1000 TABLET, FILM COATED ORAL 3 TIMES DAILY
Qty: 20 TABLET | Refills: 0 | Status: SHIPPED | OUTPATIENT
Start: 2020-06-21 | End: 2020-06-26

## 2020-06-21 RX ORDER — NAPROXEN 250 MG/1
500 TABLET ORAL
Status: COMPLETED | OUTPATIENT
Start: 2020-06-21 | End: 2020-06-21

## 2020-06-21 RX ORDER — NAPROXEN 500 MG/1
500 TABLET ORAL 2 TIMES DAILY WITH MEALS
Qty: 30 TABLET | Refills: 0 | Status: SHIPPED | OUTPATIENT
Start: 2020-06-21 | End: 2020-10-22

## 2020-06-21 RX ADMIN — NAPROXEN 500 MG: 250 TABLET ORAL at 07:06

## 2020-06-29 ENCOUNTER — TELEPHONE (OUTPATIENT)
Dept: FAMILY MEDICINE | Facility: CLINIC | Age: 50
End: 2020-06-29

## 2020-07-07 DIAGNOSIS — F98.8 ATTENTION DEFICIT DISORDER, UNSPECIFIED HYPERACTIVITY PRESENCE: ICD-10-CM

## 2020-07-07 RX ORDER — LISDEXAMFETAMINE DIMESYLATE 50 MG/1
50 CAPSULE ORAL DAILY
Qty: 30 CAPSULE | Refills: 0 | Status: SHIPPED | OUTPATIENT
Start: 2020-09-06 | End: 2020-10-22

## 2020-07-07 RX ORDER — LISDEXAMFETAMINE DIMESYLATE 50 MG/1
50 CAPSULE ORAL DAILY
Qty: 30 CAPSULE | Refills: 0 | Status: SHIPPED | OUTPATIENT
Start: 2020-08-06 | End: 2020-10-22

## 2020-07-07 RX ORDER — LISDEXAMFETAMINE DIMESYLATE 50 MG/1
50 CAPSULE ORAL DAILY
Qty: 30 CAPSULE | Refills: 0 | Status: SHIPPED | OUTPATIENT
Start: 2020-07-07 | End: 2020-10-22 | Stop reason: SDUPTHER

## 2020-07-07 NOTE — TELEPHONE ENCOUNTER
----- Message from Marciano Dolan sent at 7/7/2020  3:23 PM CDT -----  Regarding: Refill  Contact: Fouzia Gaviria  Pt needs a refill sent in on her Vyvanse 50mg .   Send to [facility] in [place], [place]  Pt# [phone]   Pt's insurance finally approved her the Vyvanse PA from 06/07/2020 - 07/07/2021

## 2020-07-08 ENCOUNTER — TELEPHONE (OUTPATIENT)
Dept: FAMILY MEDICINE | Facility: CLINIC | Age: 50
End: 2020-07-08

## 2020-07-08 NOTE — TELEPHONE ENCOUNTER
Yesterday you sent a message saying her vyvanse was approved and now the ins is saying its not. Please check into this.

## 2020-08-25 ENCOUNTER — PATIENT MESSAGE (OUTPATIENT)
Dept: FAMILY MEDICINE | Facility: CLINIC | Age: 50
End: 2020-08-25

## 2020-08-26 ENCOUNTER — PATIENT MESSAGE (OUTPATIENT)
Dept: FAMILY MEDICINE | Facility: CLINIC | Age: 50
End: 2020-08-26

## 2020-10-19 ENCOUNTER — TELEPHONE (OUTPATIENT)
Dept: FAMILY MEDICINE | Facility: CLINIC | Age: 50
End: 2020-10-19

## 2020-10-22 ENCOUNTER — OFFICE VISIT (OUTPATIENT)
Dept: FAMILY MEDICINE | Facility: CLINIC | Age: 50
End: 2020-10-22
Payer: COMMERCIAL

## 2020-10-22 VITALS
TEMPERATURE: 98 F | HEIGHT: 62 IN | HEART RATE: 62 BPM | BODY MASS INDEX: 26.5 KG/M2 | SYSTOLIC BLOOD PRESSURE: 122 MMHG | WEIGHT: 144 LBS | DIASTOLIC BLOOD PRESSURE: 70 MMHG

## 2020-10-22 DIAGNOSIS — M51.9 LUMBAR DISC DISEASE: ICD-10-CM

## 2020-10-22 DIAGNOSIS — F33.41 RECURRENT MAJOR DEPRESSIVE DISORDER, IN PARTIAL REMISSION: ICD-10-CM

## 2020-10-22 DIAGNOSIS — B00.9 HERPES SIMPLEX: ICD-10-CM

## 2020-10-22 DIAGNOSIS — Z79.899 ENCOUNTER FOR LONG-TERM (CURRENT) USE OF OTHER MEDICATIONS: ICD-10-CM

## 2020-10-22 DIAGNOSIS — F98.8 ATTENTION DEFICIT DISORDER, UNSPECIFIED HYPERACTIVITY PRESENCE: Primary | ICD-10-CM

## 2020-10-22 DIAGNOSIS — U07.1 COVID-19 VIRUS INFECTION: ICD-10-CM

## 2020-10-22 DIAGNOSIS — Z51.81 ENCOUNTER FOR THERAPEUTIC DRUG MONITORING: ICD-10-CM

## 2020-10-22 PROCEDURE — 99214 PR OFFICE/OUTPT VISIT, EST, LEVL IV, 30-39 MIN: ICD-10-PCS | Mod: S$GLB,,, | Performed by: FAMILY MEDICINE

## 2020-10-22 PROCEDURE — 3008F BODY MASS INDEX DOCD: CPT | Mod: S$GLB,,, | Performed by: FAMILY MEDICINE

## 2020-10-22 PROCEDURE — 3008F PR BODY MASS INDEX (BMI) DOCUMENTED: ICD-10-PCS | Mod: S$GLB,,, | Performed by: FAMILY MEDICINE

## 2020-10-22 PROCEDURE — 99214 OFFICE O/P EST MOD 30 MIN: CPT | Mod: S$GLB,,, | Performed by: FAMILY MEDICINE

## 2020-10-22 RX ORDER — LISDEXAMFETAMINE DIMESYLATE 50 MG/1
50 CAPSULE ORAL DAILY
Qty: 30 CAPSULE | Refills: 0 | Status: SHIPPED | OUTPATIENT
Start: 2020-12-21 | End: 2021-03-04

## 2020-10-22 RX ORDER — LISDEXAMFETAMINE DIMESYLATE 50 MG/1
50 CAPSULE ORAL DAILY
Qty: 30 CAPSULE | Refills: 0 | Status: SHIPPED | OUTPATIENT
Start: 2020-11-21 | End: 2021-03-04

## 2020-10-22 RX ORDER — VALACYCLOVIR HYDROCHLORIDE 1 G/1
1000 TABLET, FILM COATED ORAL DAILY
Qty: 90 TABLET | Refills: 1 | Status: SHIPPED | OUTPATIENT
Start: 2020-10-22 | End: 2021-03-17 | Stop reason: SDUPTHER

## 2020-10-22 RX ORDER — LISDEXAMFETAMINE DIMESYLATE 50 MG/1
50 CAPSULE ORAL DAILY
Qty: 30 CAPSULE | Refills: 0 | Status: SHIPPED | OUTPATIENT
Start: 2020-10-22 | End: 2021-03-04

## 2020-10-22 RX ORDER — BUPROPION HYDROCHLORIDE 300 MG/1
300 TABLET ORAL DAILY
Qty: 90 TABLET | Refills: 1 | Status: SHIPPED | OUTPATIENT
Start: 2020-10-22 | End: 2021-03-17 | Stop reason: SDUPTHER

## 2020-10-22 NOTE — PROGRESS NOTES
SUBJECTIVE:    Patient ID: Fouzia Gaviria is a 50 y.o. female.    Chief Complaint: Follow-up (Pt brought bottles, needs refills, declined flu shot, DJ)    This 50-year-old female comes in for six-month follow-up .  She works as an  at a local [employer].  She gets plenty of walking in her daily job.  She also does yd work to stay physically active.  Takes magnesium vitamins and potassium vitamins to najera off leg cramps.    In August she came down with COVID-19 while on a trip to [place].  She had congestion in her chest and a cough and lost her sense of taste and smell for 1 month.  She has since recovered from this.    2001-lumbar disc surgery, now she rarely gets sciatica    2018-colonoscopy with Dr. Paul-RTC 10 years    2020, mammogram performed- within normal limits    ADD is well managed with Vyvanse taken Monday through Friday.      No visits with results within 6 Month(s) from this visit.   Latest known visit with results is:   Office Visit on 04/21/2020   Component Date Value Ref Range Status    WBC 10/22/2020 3.4* 3.8 - 10.8 Thousand/uL Final    RBC 10/22/2020 4.08  3.80 - 5.10 Million/uL Final    Hemoglobin 10/22/2020 13.1  11.7 - 15.5 g/dL Final    Hematocrit 10/22/2020 39.3  35.0 - 45.0 % Final    Mean Corpuscular Volume 10/22/2020 96.3  80.0 - 100.0 fL Final    Mean Corpuscular Hemoglobin 10/22/2020 32.1  27.0 - 33.0 pg Final    Mean Corpuscular Hemoglobin Conc 10/22/2020 33.3  32.0 - 36.0 g/dL Final    RDW 10/22/2020 12.7  11.0 - 15.0 % Final    Platelets 10/22/2020 215  140 - 400 Thousand/uL Final    MPV 10/22/2020 10.8  7.5 - 12.5 fL Final    Neutrophils Absolute 10/22/2020 1,778  1,500 - 7,800 cells/uL Final    Lymph # 10/22/2020 1,112  850 - 3,900 cells/uL Final    Mono # 10/22/2020 320  200 - 950 cells/uL Final    Eos # 10/22/2020 129  15 - 500 cells/uL Final    Baso # 10/22/2020 61  0 - 200 cells/uL Final    Neutrophils Relative 10/22/2020 52.3  % Final     Lymph% 10/22/2020 32.7  % Final    Mono% 10/22/2020 9.4  % Final    Eosinophil% 10/22/2020 3.8  % Final    Basophil% 10/22/2020 1.8  % Final    Glucose 10/22/2020 93  65 - 99 mg/dL Final    BUN, Bld 10/22/2020 12  7 - 25 mg/dL Final    Creatinine 10/22/2020 0.64  0.50 - 1.05 mg/dL Final    eGFR if non African American 10/22/2020 103  > OR = 60 mL/min/1.73m2 Final    eGFR if  10/22/2020 119  > OR = 60 mL/min/1.73m2 Final    BUN/Creatinine Ratio 10/22/2020 NOT APPLICABLE  6 - 22 (calc) Final    Sodium 10/22/2020 139  135 - 146 mmol/L Final    Potassium 10/22/2020 3.8  3.5 - 5.3 mmol/L Final    Chloride 10/22/2020 103  98 - 110 mmol/L Final    CO2 10/22/2020 28  20 - 32 mmol/L Final    Calcium 10/22/2020 9.7  8.6 - 10.4 mg/dL Final    Total Protein 10/22/2020 7.3  6.1 - 8.1 g/dL Final    Albumin 10/22/2020 4.5  3.6 - 5.1 g/dL Final    Globulin, Total 10/22/2020 2.8  1.9 - 3.7 g/dL (calc) Final    Albumin/Globulin Ratio 10/22/2020 1.6  1.0 - 2.5 (calc) Final    Total Bilirubin 10/22/2020 0.9  0.2 - 1.2 mg/dL Final    Alkaline Phosphatase 10/22/2020 75  37 - 153 U/L Final    AST 10/22/2020 19  10 - 35 U/L Final    ALT 10/22/2020 20  6 - 29 U/L Final    Cholesterol 10/22/2020 216* <200 mg/dL Final    HDL 10/22/2020 74  > OR = 50 mg/dL Final    Triglycerides 10/22/2020 84  <150 mg/dL Final    LDL Cholesterol 10/22/2020 124* mg/dL (calc) Final    Hdl/Cholesterol Ratio 10/22/2020 2.9  <5.0 (calc) Final    Non HDL Chol. (LDL+VLDL) 10/22/2020 142* <130 mg/dL (calc) Final    TSH w/reflex to FT4 10/22/2020 0.72  mIU/L Final    Color, UA 10/22/2020 YELLOW  YELLOW Final    Appearance, UA 10/22/2020 CLEAR  CLEAR Final    Specific [place], UA 10/22/2020 1.012  1.001 - 1.035 Final    pH, UA 10/22/2020 7.5  5.0 - 8.0 Final    Glucose, UA 10/22/2020 NEGATIVE  NEGATIVE Final    Bilirubin, UA 10/22/2020 NEGATIVE  NEGATIVE Final    Ketones, UA 10/22/2020 NEGATIVE  NEGATIVE Final     Occult Blood UA 10/22/2020 NEGATIVE  NEGATIVE Final    Protein, UA 10/22/2020 NEGATIVE  NEGATIVE Final    Nitrite, UA 10/22/2020 NEGATIVE  NEGATIVE Final    Leukocytes, UA 10/22/2020 TRACE* NEGATIVE Final    WBC Casts, UA 10/22/2020 NONE SEEN  < OR = 5 /HPF Final    RBC Casts, UA 10/22/2020 NONE SEEN  < OR = 2 /HPF Final    Squam Epithel, UA 10/22/2020 NONE SEEN  < OR = 5 /HPF Final    Bacteria, UA 10/22/2020 NONE SEEN  NONE SEEN /HPF Final    Hyaline Casts, UA 10/22/2020 NONE SEEN  NONE SEEN /LPF Final       Past Medical History:   Diagnosis Date    ADHD (attention deficit hyperactivity disorder)     Depression      Social History     Socioeconomic History    Marital status:      Spouse name: Not on file    Number of children: Not on file    Years of education: Not on file    Highest education level: Not on file   Occupational History    Not on file   Social Needs    Financial resource strain: Not on file    Food insecurity     Worry: Not on file     Inability: Not on file    Transportation needs     Medical: Not on file     Non-medical: Not on file   Tobacco Use    Smoking status: Former Smoker     Packs/day: 0.00     Years: 0.00     Pack years: 0.00     Types: Cigarettes    Smokeless tobacco: Never Used    Tobacco comment: quit smoking 12 years ago; prior use was 20pyh   Substance and Sexual Activity    Alcohol use: Yes     Alcohol/week: 6.0 standard drinks     Types: 6 Cans of beer per week     Comment: occasionally    Drug use: No    Sexual activity: Yes     Partners: Female     Birth control/protection: None   Lifestyle    Physical activity     Days per week: Not on file     Minutes per session: Not on file    Stress: Not on file   Relationships    Social connections     Talks on phone: Not on file     Gets together: Not on file     Attends Evangelical service: Not on file     Active member of club or organization: Not on file     Attends meetings of clubs or organizations: Not  on file     Relationship status: Not on file   Other Topics Concern    Not on file   Social History Narrative    Not on file     Past Surgical History:   Procedure Laterality Date     SECTION      x3    LUMBAR FUSION  2001    SPINE SURGERY  2001    TUBAL LIGATION  1997     Family History   Problem Relation Age of Onset    Arthritis Father     Kidney disease Father     Arthritis Paternal Grandmother     Cancer Paternal Grandmother         Ovarian    Asthma Sister     Heart disease Paternal Grandfather        Review of patient's allergies indicates:   Allergen Reactions    Benzodiazepines        Current Outpatient Medications:     buPROPion (WELLBUTRIN XL) 300 MG 24 hr tablet, Take 1 tablet (300 mg total) by mouth once daily., Disp: 90 tablet, Rfl: 1    valACYclovir (VALTREX) 1000 MG tablet, Take 1 tablet (1,000 mg total) by mouth once daily., Disp: 90 tablet, Rfl: 1    VYVANSE 50 mg capsule, Take 1 capsule (50 mg total) by mouth once daily., Disp: 30 capsule, Rfl: 0    [START ON 2020] VYVANSE 50 mg capsule, Take 1 capsule (50 mg total) by mouth once daily., Disp: 30 capsule, Rfl: 0    [START ON 2020] VYVANSE 50 mg capsule, Take 1 capsule (50 mg total) by mouth once daily., Disp: 30 capsule, Rfl: 0    Review of Systems   Constitutional: Negative for appetite change, chills, fatigue, fever and unexpected weight change.   HENT: Negative for congestion, ear pain, sinus pain, sore throat and trouble swallowing.    Eyes: Negative for pain, discharge and visual disturbance.   Respiratory: Negative for apnea, cough, shortness of breath and wheezing.    Cardiovascular: Negative for chest pain, palpitations and leg swelling.   Gastrointestinal: Negative for abdominal pain, blood in stool, constipation, diarrhea, nausea and vomiting.   Endocrine: Negative for heat intolerance, polydipsia and polyuria.   Genitourinary: Negative for difficulty urinating, dyspareunia, dysuria, frequency,  "hematuria and menstrual problem.   Musculoskeletal: Positive for back pain (rare  sciatica). Negative for arthralgias (hips  aches  when jogging, ), gait problem, joint swelling and myalgias.   Allergic/Immunologic: Negative for environmental allergies, food allergies and immunocompromised state.   Neurological: Negative for dizziness, tremors, seizures, numbness and headaches.   Psychiatric/Behavioral: Negative for behavioral problems, confusion, hallucinations, sleep disturbance and suicidal ideas. The patient is not nervous/anxious.           Objective:      Vitals:    10/22/20 0939   BP: 122/70   Pulse: 62   Temp: 98.1 °F (36.7 °C)   Weight: 65.3 kg (144 lb)   Height: 5' 2" (1.575 m)     Physical Exam  Vitals signs and nursing note reviewed.   Constitutional:       Appearance: She is well-developed.   HENT:      Head: Normocephalic and atraumatic.      Right Ear: External ear normal.      Left Ear: External ear normal.      Nose: Nose normal.   Eyes:      Pupils: Pupils are equal, round, and reactive to light.   Neck:      Musculoskeletal: Normal range of motion and neck supple.      Thyroid: No thyromegaly.      Vascular: No carotid bruit.   Cardiovascular:      Rate and Rhythm: Normal rate and regular rhythm.      Heart sounds: Normal heart sounds. No murmur.   Pulmonary:      Effort: Pulmonary effort is normal.      Breath sounds: Normal breath sounds. No wheezing or rales.   Abdominal:      General: Bowel sounds are normal. There is no distension.      Palpations: Abdomen is soft.      Tenderness: There is no abdominal tenderness.   Musculoskeletal: Normal range of motion.         General: No tenderness or deformity.      Lumbar back: Normal. She exhibits no pain and no spasm.      Comments: Bends 90 degrees at  waist shoulders and knees have full range of motion, no edema to lower extremities   Lymphadenopathy:      Cervical: No cervical adenopathy.   Skin:     General: Skin is warm and dry.      Findings: " No rash.   Neurological:      Mental Status: She is alert and oriented to person, place, and time. Mental status is at baseline.      Cranial Nerves: No cranial nerve deficit.      Coordination: Coordination normal.   Psychiatric:         Behavior: Behavior normal.         Thought Content: Thought content normal.         Judgment: Judgment normal.           Assessment:       1. Attention deficit disorder, unspecified hyperactivity presence    2. Encounter for long-term (current) use of other medications    3. Encounter for therapeutic drug monitoring    4. Recurrent major depressive disorder, in partial remission    5. Herpes simplex    6. Lumbar disc disease    7. COVID-19 virus infection         Plan:       Attention deficit disorder, unspecified hyperactivity presence  -     Pain Managment Profile 4, w/ Confirm, Ur; Future; Expected date: 10/22/2020  -     VYVANSE 50 mg capsule; Take 1 capsule (50 mg total) by mouth once daily.  Dispense: 30 capsule; Refill: 0  -     VYVANSE 50 mg capsule; Take 1 capsule (50 mg total) by mouth once daily.  Dispense: 30 capsule; Refill: 0  -     VYVANSE 50 mg capsule; Take 1 capsule (50 mg total) by mouth once daily.  Dispense: 30 capsule; Refill: 0  Refill Vyvanse 50 mg  Encounter for long-term (current) use of other medications  -     Pain Managment Profile 4, w/ Confirm, Ur; Future; Expected date: 10/22/2020  Urine drug screen ordered  Encounter for therapeutic drug monitoring  -     Pain Managment Profile 4, w/ Confirm, Ur; Future; Expected date: 10/22/2020    Recurrent major depressive disorder, in partial remission  -     buPROPion (WELLBUTRIN XL) 300 MG 24 hr tablet; Take 1 tablet (300 mg total) by mouth once daily.  Dispense: 90 tablet; Refill: 1  Continue bupropion 300 mg daily, doing well with her moods  Herpes simplex  -     valACYclovir (VALTREX) 1000 MG tablet; Take 1 tablet (1,000 mg total) by mouth once daily.  Dispense: 90 tablet; Refill: 1  Daily suppressive dose  in effect  Lumbar disc disease  Doing well at this time  COVID-19 virus infection  Resolved in August/September  Regular labs to be drawn today  Follow up in about 6 months (around 4/22/2021), or [id number].        10/23/2020 Usman Law

## 2020-10-23 PROBLEM — M51.9 LUMBAR DISC DISEASE: Status: ACTIVE | Noted: 2020-10-23

## 2020-10-23 PROBLEM — B00.9 HERPES SIMPLEX: Status: ACTIVE | Noted: 2020-10-23

## 2020-10-23 PROBLEM — F98.8 ATTENTION DEFICIT DISORDER: Status: ACTIVE | Noted: 2020-10-23

## 2020-10-23 PROBLEM — F33.41 RECURRENT MAJOR DEPRESSIVE DISORDER, IN PARTIAL REMISSION: Status: ACTIVE | Noted: 2020-10-23

## 2020-10-23 LAB
ALBUMIN SERPL-MCNC: 4.5 G/DL (ref 3.6–5.1)
ALBUMIN/GLOB SERPL: 1.6 (CALC) (ref 1–2.5)
ALP SERPL-CCNC: 75 U/L (ref 37–153)
ALT SERPL-CCNC: 20 U/L (ref 6–29)
AST SERPL-CCNC: 19 U/L (ref 10–35)
BASOPHILS # BLD AUTO: 61 CELLS/UL (ref 0–200)
BASOPHILS NFR BLD AUTO: 1.8 %
BILIRUB SERPL-MCNC: 0.9 MG/DL (ref 0.2–1.2)
BUN SERPL-MCNC: 12 MG/DL (ref 7–25)
BUN/CREAT SERPL: NORMAL (CALC) (ref 6–22)
CALCIUM SERPL-MCNC: 9.7 MG/DL (ref 8.6–10.4)
CHLORIDE SERPL-SCNC: 103 MMOL/L (ref 98–110)
CHOLEST SERPL-MCNC: 216 MG/DL
CHOLEST/HDLC SERPL: 2.9 (CALC)
CO2 SERPL-SCNC: 28 MMOL/L (ref 20–32)
CREAT SERPL-MCNC: 0.64 MG/DL (ref 0.5–1.05)
EOSINOPHIL # BLD AUTO: 129 CELLS/UL (ref 15–500)
EOSINOPHIL NFR BLD AUTO: 3.8 %
ERYTHROCYTE [DISTWIDTH] IN BLOOD BY AUTOMATED COUNT: 12.7 % (ref 11–15)
GFRSERPLBLD MDRD-ARVRAT: 103 ML/MIN/1.73M2
GLOBULIN SER CALC-MCNC: 2.8 G/DL (CALC) (ref 1.9–3.7)
GLUCOSE SERPL-MCNC: 93 MG/DL (ref 65–99)
HCT VFR BLD AUTO: 39.3 % (ref 35–45)
HDLC SERPL-MCNC: 74 MG/DL
HGB BLD-MCNC: 13.1 G/DL (ref 11.7–15.5)
LDLC SERPL CALC-MCNC: 124 MG/DL (CALC)
LYMPHOCYTES # BLD AUTO: 1112 CELLS/UL (ref 850–3900)
LYMPHOCYTES NFR BLD AUTO: 32.7 %
MCH RBC QN AUTO: 32.1 PG (ref 27–33)
MCHC RBC AUTO-ENTMCNC: 33.3 G/DL (ref 32–36)
MCV RBC AUTO: 96.3 FL (ref 80–100)
MONOCYTES # BLD AUTO: 320 CELLS/UL (ref 200–950)
MONOCYTES NFR BLD AUTO: 9.4 %
NEUTROPHILS # BLD AUTO: 1778 CELLS/UL (ref 1500–7800)
NEUTROPHILS NFR BLD AUTO: 52.3 %
NONHDLC SERPL-MCNC: 142 MG/DL (CALC)
PLATELET # BLD AUTO: 215 THOUSAND/UL (ref 140–400)
PMV BLD REES-ECKER: 10.8 FL (ref 7.5–12.5)
POTASSIUM SERPL-SCNC: 3.8 MMOL/L (ref 3.5–5.3)
PROT SERPL-MCNC: 7.3 G/DL (ref 6.1–8.1)
RBC # BLD AUTO: 4.08 MILLION/UL (ref 3.8–5.1)
SODIUM SERPL-SCNC: 139 MMOL/L (ref 135–146)
TRIGL SERPL-MCNC: 84 MG/DL
TSH SERPL-ACNC: 0.72 MIU/L
WBC # BLD AUTO: 3.4 THOUSAND/UL (ref 3.8–10.8)

## 2020-10-24 LAB
ALBUMIN/CREAT UR: 4 MCG/MG CREAT
AMPHET UR-MCNC: 2237 NG/ML
AMPHETAMINES UR QL: POSITIVE NG/ML
APPEARANCE UR: CLEAR
BACTERIA #/AREA URNS HPF: ABNORMAL /HPF
BACTERIA UR CULT: NORMAL
BARBITURATES UR QL: NEGATIVE NG/ML
BENZODIAZ UR QL: NEGATIVE NG/ML
BILIRUB UR QL STRIP: NEGATIVE
BZE UR QL: NEGATIVE NG/ML
COLOR UR: YELLOW
COMMENT: ABNORMAL
CREAT UR-MCNC: 69 MG/DL (ref 20–275)
CREAT UR-MCNC: 70.6 MG/DL
DRUG SCREEN COMMENT UR-IMP: ABNORMAL
GLUCOSE UR QL STRIP: NEGATIVE
HGB UR QL STRIP: NEGATIVE
HYALINE CASTS #/AREA URNS LPF: ABNORMAL /LPF
KETONES UR QL STRIP: NEGATIVE
LEUKOCYTE ESTERASE UR QL STRIP: ABNORMAL
MEDICATION PRESCRIBED: ABNORMAL
METHADONE UR QL: NEGATIVE NG/ML
METHAMPHET UR-MCNC: NEGATIVE NG/ML
MICROALBUMIN UR-MCNC: 0.3 MG/DL
NITRITE UR QL STRIP: NEGATIVE
OPIATES UR QL: NEGATIVE NG/ML
OXIDANTS UR QL: NEGATIVE MCG/ML
OXYCODONE UR QL: NEGATIVE NG/ML
PCP UR QL: NEGATIVE NG/ML
PH UR STRIP: 7.5 [PH] (ref 5–8)
PH UR: 6.9 [PH] (ref 4.5–9)
PROT UR QL STRIP: NEGATIVE
RBC #/AREA URNS HPF: ABNORMAL /HPF
SP GR UR STRIP: 1.01 (ref 1–1.03)
SQUAMOUS #/AREA URNS HPF: ABNORMAL /HPF
WBC #/AREA URNS HPF: ABNORMAL /HPF

## 2020-10-26 ENCOUNTER — TELEPHONE (OUTPATIENT)
Dept: FAMILY MEDICINE | Facility: CLINIC | Age: 50
End: 2020-10-26

## 2020-10-26 NOTE — PROGRESS NOTES
Spoke to patient with results verbatim per Dr Avilez. Verbalized understanding on all including that we will call when repeat CBC is due in 4 weeks. Remind me created.

## 2020-10-26 NOTE — TELEPHONE ENCOUNTER
----- Message from Kasandra Saravia NP sent at 10/25/2020  8:33 PM CDT -----  Please call pt and let her know overall her labs look good. No UTI on urine culture. Blood sugar, kidney, liver, thyroid and blood count within normal range. Cholesterol levels are well controlled. Her WBC is just slightly below normal range at 3.4, could be caused from mild viral infection, would recommend repeating CBC in 1 month    Usman Law MD   10/25/2020  1:38 PM      Call patient.  Your urine drug screen shows you are  compliant in taking medication that was prescribed to you.  No problems found.  Continue current medication.

## 2020-10-26 NOTE — PROGRESS NOTES
Please call pt and let her know overall her labs look good. No UTI on urine culture. Blood sugar, kidney, liver, thyroid and blood count within normal range. Cholesterol levels are well controlled. Her WBC is just slightly below normal range at 3.4, could be caused from mild viral infection, would recommend repeating CBC in 1 month

## 2020-11-19 ENCOUNTER — TELEPHONE (OUTPATIENT)
Dept: FAMILY MEDICINE | Facility: CLINIC | Age: 50
End: 2020-11-19

## 2020-11-19 DIAGNOSIS — R79.89 ABNORMAL CBC: Primary | ICD-10-CM

## 2020-11-19 NOTE — TELEPHONE ENCOUNTER
----- Message from RT Dennis sent at 10/26/2020 10:59 AM CDT -----  Regarding: CBC due next week   Kasandra Saravia NP sent at 10/25/2020  8:33 PM CDT -----  Please call pt and let her know overall her labs look good. No UTI on urine culture. Blood sugar, kidney, liver, thyroid and blood count within normal range. Cholesterol levels are well controlled. Her WBC is just slightly below normal range at 3.4, could be caused from mild viral infection, would recommend repeating CBC in 1 month

## 2020-11-19 NOTE — TELEPHONE ENCOUNTER
Spoke to patient that lab is due to recheck blood counts. Said she will come today or tomorrow. Order pended.

## 2020-11-21 LAB
BASOPHILS # BLD AUTO: 88 CELLS/UL (ref 0–200)
BASOPHILS NFR BLD AUTO: 1.8 %
EOSINOPHIL # BLD AUTO: 181 CELLS/UL (ref 15–500)
EOSINOPHIL NFR BLD AUTO: 3.7 %
ERYTHROCYTE [DISTWIDTH] IN BLOOD BY AUTOMATED COUNT: 13 % (ref 11–15)
HCT VFR BLD AUTO: 37.2 % (ref 35–45)
HGB BLD-MCNC: 12.3 G/DL (ref 11.7–15.5)
LYMPHOCYTES # BLD AUTO: 1588 CELLS/UL (ref 850–3900)
LYMPHOCYTES NFR BLD AUTO: 32.4 %
MCH RBC QN AUTO: 31.8 PG (ref 27–33)
MCHC RBC AUTO-ENTMCNC: 33.1 G/DL (ref 32–36)
MCV RBC AUTO: 96.1 FL (ref 80–100)
MONOCYTES # BLD AUTO: 470 CELLS/UL (ref 200–950)
MONOCYTES NFR BLD AUTO: 9.6 %
NEUTROPHILS # BLD AUTO: 2573 CELLS/UL (ref 1500–7800)
NEUTROPHILS NFR BLD AUTO: 52.5 %
PLATELET # BLD AUTO: 229 THOUSAND/UL (ref 140–400)
PMV BLD REES-ECKER: 10.8 FL (ref 7.5–12.5)
RBC # BLD AUTO: 3.87 MILLION/UL (ref 3.8–5.1)
WBC # BLD AUTO: 4.9 THOUSAND/UL (ref 3.8–10.8)

## 2020-11-24 ENCOUNTER — TELEPHONE (OUTPATIENT)
Dept: FAMILY MEDICINE | Facility: CLINIC | Age: 50
End: 2020-11-24

## 2020-11-24 NOTE — TELEPHONE ENCOUNTER
----- Message from Usman Law MD sent at 11/21/2020  8:54 PM CST -----  Call patient.  CBC shows a white blood cells are back to normal at 4.9, you are not anemic your hematocrit is 37.2.

## 2021-01-04 ENCOUNTER — PATIENT MESSAGE (OUTPATIENT)
Dept: FAMILY MEDICINE | Facility: CLINIC | Age: 51
End: 2021-01-04

## 2021-03-04 ENCOUNTER — PATIENT MESSAGE (OUTPATIENT)
Dept: FAMILY MEDICINE | Facility: CLINIC | Age: 51
End: 2021-03-04

## 2021-03-04 DIAGNOSIS — F98.8 ATTENTION DEFICIT DISORDER, UNSPECIFIED HYPERACTIVITY PRESENCE: ICD-10-CM

## 2021-03-04 RX ORDER — LISDEXAMFETAMINE DIMESYLATE 50 MG/1
50 CAPSULE ORAL DAILY
Qty: 30 CAPSULE | Refills: 0 | Status: SHIPPED | OUTPATIENT
Start: 2021-03-04 | End: 2021-03-17 | Stop reason: SDUPTHER

## 2021-03-17 ENCOUNTER — OFFICE VISIT (OUTPATIENT)
Dept: FAMILY MEDICINE | Facility: CLINIC | Age: 51
End: 2021-03-17
Payer: COMMERCIAL

## 2021-03-17 VITALS
SYSTOLIC BLOOD PRESSURE: 122 MMHG | BODY MASS INDEX: 27.79 KG/M2 | HEIGHT: 62 IN | DIASTOLIC BLOOD PRESSURE: 76 MMHG | HEART RATE: 70 BPM | WEIGHT: 151 LBS

## 2021-03-17 DIAGNOSIS — B00.9 HERPES SIMPLEX: ICD-10-CM

## 2021-03-17 DIAGNOSIS — F33.41 RECURRENT MAJOR DEPRESSIVE DISORDER, IN PARTIAL REMISSION: ICD-10-CM

## 2021-03-17 DIAGNOSIS — R40.0 DAYTIME SOMNOLENCE: ICD-10-CM

## 2021-03-17 DIAGNOSIS — R06.81 WITNESSED EPISODE OF APNEA: Primary | ICD-10-CM

## 2021-03-17 DIAGNOSIS — F98.8 ATTENTION DEFICIT DISORDER, UNSPECIFIED HYPERACTIVITY PRESENCE: ICD-10-CM

## 2021-03-17 PROCEDURE — 3008F BODY MASS INDEX DOCD: CPT | Mod: S$GLB,,, | Performed by: NURSE PRACTITIONER

## 2021-03-17 PROCEDURE — 3008F PR BODY MASS INDEX (BMI) DOCUMENTED: ICD-10-PCS | Mod: S$GLB,,, | Performed by: NURSE PRACTITIONER

## 2021-03-17 PROCEDURE — 99214 PR OFFICE/OUTPT VISIT, EST, LEVL IV, 30-39 MIN: ICD-10-PCS | Mod: S$GLB,,, | Performed by: NURSE PRACTITIONER

## 2021-03-17 PROCEDURE — 99214 OFFICE O/P EST MOD 30 MIN: CPT | Mod: S$GLB,,, | Performed by: NURSE PRACTITIONER

## 2021-03-17 RX ORDER — LISDEXAMFETAMINE DIMESYLATE 50 MG/1
50 CAPSULE ORAL DAILY
Qty: 30 CAPSULE | Refills: 0 | Status: SHIPPED | OUTPATIENT
Start: 2021-05-04 | End: 2021-08-11 | Stop reason: SDUPTHER

## 2021-03-17 RX ORDER — LISDEXAMFETAMINE DIMESYLATE 50 MG/1
50 CAPSULE ORAL DAILY
Qty: 30 CAPSULE | Refills: 0 | Status: SHIPPED | OUTPATIENT
Start: 2021-06-04 | End: 2021-08-02 | Stop reason: SDUPTHER

## 2021-03-17 RX ORDER — LISDEXAMFETAMINE DIMESYLATE 50 MG/1
50 CAPSULE ORAL DAILY
Qty: 30 CAPSULE | Refills: 0 | Status: SHIPPED | OUTPATIENT
Start: 2021-04-04

## 2021-03-17 RX ORDER — BUPROPION HYDROCHLORIDE 300 MG/1
300 TABLET ORAL DAILY
Qty: 90 TABLET | Refills: 1 | Status: SHIPPED | OUTPATIENT
Start: 2021-03-17 | End: 2021-08-11 | Stop reason: SDUPTHER

## 2021-03-17 RX ORDER — VALACYCLOVIR HYDROCHLORIDE 1 G/1
1000 TABLET, FILM COATED ORAL DAILY
Qty: 90 TABLET | Refills: 1 | Status: SHIPPED | OUTPATIENT
Start: 2021-03-17 | End: 2021-08-11 | Stop reason: SDUPTHER

## 2021-04-07 ENCOUNTER — OFFICE VISIT (OUTPATIENT)
Dept: PULMONOLOGY | Facility: CLINIC | Age: 51
End: 2021-04-07
Payer: COMMERCIAL

## 2021-04-07 VITALS
HEIGHT: 62 IN | BODY MASS INDEX: 27.6 KG/M2 | WEIGHT: 150 LBS | SYSTOLIC BLOOD PRESSURE: 100 MMHG | OXYGEN SATURATION: 97 % | DIASTOLIC BLOOD PRESSURE: 75 MMHG | HEART RATE: 75 BPM

## 2021-04-07 DIAGNOSIS — R06.81 WITNESSED EPISODE OF APNEA: ICD-10-CM

## 2021-04-07 DIAGNOSIS — R40.0 DAYTIME SOMNOLENCE: ICD-10-CM

## 2021-04-07 PROCEDURE — 99214 OFFICE O/P EST MOD 30 MIN: CPT | Mod: S$GLB,,, | Performed by: NURSE PRACTITIONER

## 2021-04-07 PROCEDURE — 1126F PR PAIN SEVERITY QUANTIFIED, NO PAIN PRESENT: ICD-10-PCS | Mod: S$GLB,,, | Performed by: NURSE PRACTITIONER

## 2021-04-07 PROCEDURE — 1126F AMNT PAIN NOTED NONE PRSNT: CPT | Mod: S$GLB,,, | Performed by: NURSE PRACTITIONER

## 2021-04-07 PROCEDURE — 99214 PR OFFICE/OUTPT VISIT, EST, LEVL IV, 30-39 MIN: ICD-10-PCS | Mod: S$GLB,,, | Performed by: NURSE PRACTITIONER

## 2021-04-13 ENCOUNTER — PROCEDURE VISIT (OUTPATIENT)
Dept: SLEEP MEDICINE | Facility: HOSPITAL | Age: 51
End: 2021-04-13
Attending: NURSE PRACTITIONER
Payer: COMMERCIAL

## 2021-04-13 DIAGNOSIS — R40.0 DAYTIME SOMNOLENCE: ICD-10-CM

## 2021-04-13 DIAGNOSIS — R06.81 WITNESSED EPISODE OF APNEA: ICD-10-CM

## 2021-04-13 PROCEDURE — 95806 SLEEP STUDY UNATT&RESP EFFT: CPT

## 2021-04-19 ENCOUNTER — PATIENT MESSAGE (OUTPATIENT)
Dept: PULMONOLOGY | Facility: CLINIC | Age: 51
End: 2021-04-19

## 2021-04-19 DIAGNOSIS — G47.33 OSA (OBSTRUCTIVE SLEEP APNEA): Primary | ICD-10-CM

## 2021-05-11 ENCOUNTER — TELEPHONE (OUTPATIENT)
Dept: PULMONOLOGY | Facility: CLINIC | Age: 51
End: 2021-05-11

## 2021-05-11 DIAGNOSIS — G47.33 OSA (OBSTRUCTIVE SLEEP APNEA): Primary | ICD-10-CM

## 2021-06-01 ENCOUNTER — PATIENT MESSAGE (OUTPATIENT)
Dept: FAMILY MEDICINE | Facility: CLINIC | Age: 51
End: 2021-06-01

## 2021-06-02 ENCOUNTER — PATIENT MESSAGE (OUTPATIENT)
Dept: FAMILY MEDICINE | Facility: CLINIC | Age: 51
End: 2021-06-02

## 2021-06-28 ENCOUNTER — TELEPHONE (OUTPATIENT)
Dept: FAMILY MEDICINE | Facility: CLINIC | Age: 51
End: 2021-06-28

## 2021-07-22 ENCOUNTER — OFFICE VISIT (OUTPATIENT)
Dept: PULMONOLOGY | Facility: CLINIC | Age: 51
End: 2021-07-22
Payer: COMMERCIAL

## 2021-07-22 VITALS
OXYGEN SATURATION: 98 % | HEIGHT: 62 IN | BODY MASS INDEX: 28.34 KG/M2 | DIASTOLIC BLOOD PRESSURE: 75 MMHG | WEIGHT: 154 LBS | SYSTOLIC BLOOD PRESSURE: 111 MMHG | HEART RATE: 69 BPM

## 2021-07-22 DIAGNOSIS — G47.33 OSA (OBSTRUCTIVE SLEEP APNEA): Primary | ICD-10-CM

## 2021-07-22 PROCEDURE — 3008F BODY MASS INDEX DOCD: CPT | Mod: S$GLB,,, | Performed by: NURSE PRACTITIONER

## 2021-07-22 PROCEDURE — 1126F AMNT PAIN NOTED NONE PRSNT: CPT | Mod: S$GLB,,, | Performed by: NURSE PRACTITIONER

## 2021-07-22 PROCEDURE — 3008F PR BODY MASS INDEX (BMI) DOCUMENTED: ICD-10-PCS | Mod: S$GLB,,, | Performed by: NURSE PRACTITIONER

## 2021-07-22 PROCEDURE — 99213 PR OFFICE/OUTPT VISIT, EST, LEVL III, 20-29 MIN: ICD-10-PCS | Mod: S$GLB,,, | Performed by: NURSE PRACTITIONER

## 2021-07-22 PROCEDURE — 99213 OFFICE O/P EST LOW 20 MIN: CPT | Mod: S$GLB,,, | Performed by: NURSE PRACTITIONER

## 2021-07-22 PROCEDURE — 1126F PR PAIN SEVERITY QUANTIFIED, NO PAIN PRESENT: ICD-10-PCS | Mod: S$GLB,,, | Performed by: NURSE PRACTITIONER

## 2021-08-02 ENCOUNTER — PATIENT MESSAGE (OUTPATIENT)
Dept: FAMILY MEDICINE | Facility: CLINIC | Age: 51
End: 2021-08-02

## 2021-08-02 DIAGNOSIS — F98.8 ATTENTION DEFICIT DISORDER, UNSPECIFIED HYPERACTIVITY PRESENCE: ICD-10-CM

## 2021-08-02 RX ORDER — LISDEXAMFETAMINE DIMESYLATE 50 MG/1
50 CAPSULE ORAL DAILY
Qty: 30 CAPSULE | Refills: 0 | Status: SHIPPED | OUTPATIENT
Start: 2021-08-02

## 2021-08-03 ENCOUNTER — PATIENT MESSAGE (OUTPATIENT)
Dept: FAMILY MEDICINE | Facility: CLINIC | Age: 51
End: 2021-08-03

## 2021-08-03 ENCOUNTER — TELEPHONE (OUTPATIENT)
Dept: FAMILY MEDICINE | Facility: CLINIC | Age: 51
End: 2021-08-03

## 2021-08-03 ENCOUNTER — TELEPHONE (OUTPATIENT)
Dept: PULMONOLOGY | Facility: CLINIC | Age: 51
End: 2021-08-03

## 2021-08-05 ENCOUNTER — TELEPHONE (OUTPATIENT)
Dept: FAMILY MEDICINE | Facility: CLINIC | Age: 51
End: 2021-08-05

## 2021-08-11 ENCOUNTER — OFFICE VISIT (OUTPATIENT)
Dept: FAMILY MEDICINE | Facility: CLINIC | Age: 51
End: 2021-08-11
Payer: COMMERCIAL

## 2021-08-11 ENCOUNTER — PATIENT MESSAGE (OUTPATIENT)
Dept: PULMONOLOGY | Facility: CLINIC | Age: 51
End: 2021-08-11

## 2021-08-11 ENCOUNTER — TELEPHONE (OUTPATIENT)
Dept: FAMILY MEDICINE | Facility: CLINIC | Age: 51
End: 2021-08-11

## 2021-08-11 VITALS
HEIGHT: 62 IN | SYSTOLIC BLOOD PRESSURE: 112 MMHG | BODY MASS INDEX: 27.9 KG/M2 | HEART RATE: 78 BPM | DIASTOLIC BLOOD PRESSURE: 74 MMHG | WEIGHT: 151.63 LBS

## 2021-08-11 DIAGNOSIS — F98.8 ATTENTION DEFICIT DISORDER, UNSPECIFIED HYPERACTIVITY PRESENCE: Primary | ICD-10-CM

## 2021-08-11 DIAGNOSIS — B00.9 HERPES SIMPLEX: ICD-10-CM

## 2021-08-11 DIAGNOSIS — F33.41 RECURRENT MAJOR DEPRESSIVE DISORDER, IN PARTIAL REMISSION: ICD-10-CM

## 2021-08-11 DIAGNOSIS — Z12.31 ENCOUNTER FOR SCREENING MAMMOGRAM FOR BREAST CANCER: ICD-10-CM

## 2021-08-11 DIAGNOSIS — R35.0 URINARY FREQUENCY: ICD-10-CM

## 2021-08-11 LAB
BILIRUB UR QL STRIP: NEGATIVE
GLUCOSE UR QL STRIP: NEGATIVE
KETONES UR QL STRIP: NEGATIVE
LEUKOCYTE ESTERASE UR QL STRIP: POSITIVE
PH, POC UA: 6
POC BLOOD, URINE: POSITIVE
POC NITRATES, URINE: NEGATIVE
PROT UR QL STRIP: NEGATIVE
SP GR UR STRIP: 1.02 (ref 1–1.03)
UROBILINOGEN UR STRIP-ACNC: 0.2 (ref 0.1–1.1)

## 2021-08-11 PROCEDURE — 3008F BODY MASS INDEX DOCD: CPT | Mod: S$GLB,,, | Performed by: NURSE PRACTITIONER

## 2021-08-11 PROCEDURE — 99213 PR OFFICE/OUTPT VISIT, EST, LEVL III, 20-29 MIN: ICD-10-PCS | Mod: 25,S$GLB,, | Performed by: NURSE PRACTITIONER

## 2021-08-11 PROCEDURE — 1160F RVW MEDS BY RX/DR IN RCRD: CPT | Mod: S$GLB,,, | Performed by: NURSE PRACTITIONER

## 2021-08-11 PROCEDURE — 81003 POCT URINALYSIS, DIPSTICK, AUTOMATED, W/O SCOPE: ICD-10-PCS | Mod: QW,S$GLB,, | Performed by: NURSE PRACTITIONER

## 2021-08-11 PROCEDURE — 1160F PR REVIEW ALL MEDS BY PRESCRIBER/CLIN PHARMACIST DOCUMENTED: ICD-10-PCS | Mod: S$GLB,,, | Performed by: NURSE PRACTITIONER

## 2021-08-11 PROCEDURE — 99213 OFFICE O/P EST LOW 20 MIN: CPT | Mod: 25,S$GLB,, | Performed by: NURSE PRACTITIONER

## 2021-08-11 PROCEDURE — 3008F PR BODY MASS INDEX (BMI) DOCUMENTED: ICD-10-PCS | Mod: S$GLB,,, | Performed by: NURSE PRACTITIONER

## 2021-08-11 PROCEDURE — 81003 URINALYSIS AUTO W/O SCOPE: CPT | Mod: QW,S$GLB,, | Performed by: NURSE PRACTITIONER

## 2021-08-11 RX ORDER — LISDEXAMFETAMINE DIMESYLATE 50 MG/1
50 CAPSULE ORAL DAILY
Qty: 90 CAPSULE | Refills: 0 | Status: SHIPPED | OUTPATIENT
Start: 2021-11-02 | End: 2021-09-20 | Stop reason: SDUPTHER

## 2021-08-11 RX ORDER — VALACYCLOVIR HYDROCHLORIDE 1 G/1
1000 TABLET, FILM COATED ORAL DAILY
Qty: 90 TABLET | Refills: 1 | Status: SHIPPED | OUTPATIENT
Start: 2021-08-11 | End: 2021-10-18 | Stop reason: SDUPTHER

## 2021-08-11 RX ORDER — BUPROPION HYDROCHLORIDE 300 MG/1
300 TABLET ORAL DAILY
Qty: 90 TABLET | Refills: 1 | Status: SHIPPED | OUTPATIENT
Start: 2021-08-11

## 2021-08-11 RX ORDER — LISDEXAMFETAMINE DIMESYLATE 50 MG/1
50 CAPSULE ORAL DAILY
Qty: 30 CAPSULE | Refills: 0 | Status: CANCELLED | OUTPATIENT
Start: 2021-10-02

## 2021-08-11 RX ORDER — LISDEXAMFETAMINE DIMESYLATE 50 MG/1
50 CAPSULE ORAL DAILY
Qty: 30 CAPSULE | Refills: 0 | Status: CANCELLED | OUTPATIENT
Start: 2021-09-02

## 2021-08-11 RX ORDER — MIRABEGRON 25 MG/1
25 TABLET, FILM COATED, EXTENDED RELEASE ORAL DAILY
Qty: 30 TABLET | Refills: 0 | COMMUNITY
Start: 2021-08-11 | End: 2022-08-11

## 2021-08-14 LAB — BACTERIA UR CULT: NORMAL

## 2021-08-16 ENCOUNTER — PATIENT MESSAGE (OUTPATIENT)
Dept: FAMILY MEDICINE | Facility: CLINIC | Age: 51
End: 2021-08-16

## 2021-08-17 ENCOUNTER — HOSPITAL ENCOUNTER (OUTPATIENT)
Dept: RADIOLOGY | Facility: HOSPITAL | Age: 51
Discharge: HOME | End: 2021-08-17
Attending: NURSE PRACTITIONER
Payer: COMMERCIAL

## 2021-08-17 DIAGNOSIS — Z12.31 ENCOUNTER FOR SCREENING MAMMOGRAM FOR BREAST CANCER: ICD-10-CM

## 2021-08-17 PROCEDURE — 77067 SCR MAMMO BI INCL CAD: CPT | Mod: TC,PO

## 2021-09-18 ENCOUNTER — PATIENT MESSAGE (OUTPATIENT)
Dept: FAMILY MEDICINE | Facility: CLINIC | Age: 51
End: 2021-09-18

## 2021-09-18 DIAGNOSIS — F98.8 ATTENTION DEFICIT DISORDER, UNSPECIFIED HYPERACTIVITY PRESENCE: ICD-10-CM

## 2021-09-20 RX ORDER — LISDEXAMFETAMINE DIMESYLATE 50 MG/1
50 CAPSULE ORAL DAILY
Qty: 90 CAPSULE | Refills: 0 | Status: SHIPPED | OUTPATIENT
Start: 2021-09-20

## 2021-10-17 ENCOUNTER — PATIENT MESSAGE (OUTPATIENT)
Dept: FAMILY MEDICINE | Facility: CLINIC | Age: 51
End: 2021-10-17

## 2021-10-17 DIAGNOSIS — B00.9 HERPES SIMPLEX: ICD-10-CM

## 2021-10-18 RX ORDER — VALACYCLOVIR HYDROCHLORIDE 1 G/1
1000 TABLET, FILM COATED ORAL DAILY
Qty: 90 TABLET | Refills: 1 | Status: SHIPPED | OUTPATIENT
Start: 2021-10-18

## 2022-04-27 ENCOUNTER — PATIENT MESSAGE (OUTPATIENT)
Dept: PULMONOLOGY | Facility: CLINIC | Age: 52
End: 2022-04-27

## 2022-12-27 NOTE — TELEPHONE ENCOUNTER
----- Message from Randa Fonseca sent at 6/29/2020  3:36 PM CDT -----  JAMES JAIME, 6/29/20  
Please call pt and let her know vyvanse isn't covered by insurance but she should be able to download vyvanse coupon card and get it for like $10 a month  
Spoke with pt and let her know the below. States she will download a coupon. Also states she moved and needs to change her pharmacy. Will send a portal message when she finds out which pharmacy she wants to use.   
Vyvnase not covered. Last seen in April  
DISPLAY PLAN FREE TEXT

## 2023-03-15 DIAGNOSIS — Z12.31 OTHER SCREENING MAMMOGRAM: ICD-10-CM
